# Patient Record
Sex: FEMALE | Race: WHITE | NOT HISPANIC OR LATINO | Employment: UNEMPLOYED | URBAN - METROPOLITAN AREA
[De-identification: names, ages, dates, MRNs, and addresses within clinical notes are randomized per-mention and may not be internally consistent; named-entity substitution may affect disease eponyms.]

---

## 2021-05-10 ENCOUNTER — EVALUATION (OUTPATIENT)
Dept: PHYSICAL THERAPY | Facility: CLINIC | Age: 51
End: 2021-05-10
Payer: COMMERCIAL

## 2021-05-10 DIAGNOSIS — Z98.890 STATUS POST BREAST RECONSTRUCTION: Primary | ICD-10-CM

## 2021-05-10 DIAGNOSIS — M54.6 THORACIC BACK PAIN, UNSPECIFIED BACK PAIN LATERALITY, UNSPECIFIED CHRONICITY: ICD-10-CM

## 2021-05-10 PROCEDURE — 97162 PT EVAL MOD COMPLEX 30 MIN: CPT | Performed by: PHYSICAL THERAPIST

## 2021-05-10 PROCEDURE — 97110 THERAPEUTIC EXERCISES: CPT | Performed by: PHYSICAL THERAPIST

## 2021-05-10 PROCEDURE — 97140 MANUAL THERAPY 1/> REGIONS: CPT | Performed by: PHYSICAL THERAPIST

## 2021-05-10 RX ORDER — CELECOXIB 100 MG/1
100 CAPSULE ORAL AS NEEDED
COMMUNITY

## 2021-05-10 RX ORDER — SERTRALINE HYDROCHLORIDE 25 MG/1
25 TABLET, FILM COATED ORAL DAILY
COMMUNITY

## 2021-05-10 RX ORDER — TOPIRAMATE 100 MG/1
100 TABLET, FILM COATED ORAL DAILY
COMMUNITY

## 2021-05-10 NOTE — LETTER
May 11, 2021    [address], [place]  [address] [id number]    Patient: Lisbeth Rod   YOB: 1970   Date of Visit: 5/10/2021     Encounter Diagnosis     ICD-10-CM    1  Status post breast reconstruction  Z98 890    2  Thoracic back pain, unspecified back pain laterality, unspecified chronicity  M54 6        Dear Dr Bennett Ruiz:    Thank you for your recent referral of Lisbeth Rod  Please review the attached evaluation summary from [address] [place] [address] recent visit  Please verify that you agree with the plan of care by signing the attached order  If you have any questions or concerns, please do not hesitate to call  I sincerely appreciate the opportunity to share in the care of one of your patients and hope to have another opportunity to work with you in the near future  Sincerely,    Riccardo Dempsey, PT      Referring Provider:      I certify that I have read the below Plan of Care and certify the need for these services furnished under this plan of treatment while under my care  [address], [place]  [address] [address]  Via Mail          PT Evaluation     Today's date: 5/10/2021  Patient name: Lisbeht Rod  : 1970  MRN: [id number]  Referring provider: Eli Nolasco*  Dx:   Encounter Diagnosis     ICD-10-CM    1  Status post breast reconstruction  Z98 890    2  Thoracic back pain, unspecified back pain laterality, unspecified chronicity  M54 6                   Assessment  Assessment details: Lisbeth Rod is a pleasant 54 y o  presenting to physical therapy with MD referral for Status post breast reconstruction  (primary encounter diagnosis) and Thoracic back pain, unspecified back pain laterality, unspecified chronicity      Problem list: Poor posture, decreased upper extremity strength, poor periscapular strength, myofascial restrictions, and decreased thoracic joint mobility and AROM    Treatment to include: Manual therapy techniques, myofascial stretching/mobilization, RTC/periscapular strengthening, thoracic A/AA/PROM, postural re-education, instruction in a comprehensive HEP, and modalities as needed  This pt would benefit from skilled PT services to address their impairments and functional limitations to maximize functional outcome  Symptom irritability: moderateBarriers to therapy: Chronicity of symptoms  Understanding of Dx/Px/POC: good   Prognosis: good    Goals  ST  Pt will improve thoracic SB ROM to no more than mild restriction in 3 weeks  2  Pt will improve posture to no more than mild forward rounded shoulders in 3 weeks  LT  Pt will be able to turn to check blind spot while driving with minimal restriction in 6 weeks  2  Pt will be independent in a comprehensive HEP in 6 weeks  Plan  Patient would benefit from: skilled physical therapy  Frequency: 2x week  Duration in weeks: 6  Treatment plan discussed with: patient        Subjective Evaluation    History of Present Illness  Date of surgery: 2020  Mechanism of injury: surgery  Mechanism of injury: Pt reports underwent double mastectomy 2017 and had expanders placed  Pt had the expanders removed and had two sets of implants fail between January and 2019  Pt reports after these procedures she regained full ROM  Patient states 2020, she underwent latissimus flap surgery for breast reconstruction and had expanders placed  Pt reports 4 weeks after surgery, her incision dehisced on the right side due to a seroma  Pt states a few days later, the left incision looked similar and they sutured it again  Pt reports she feels as though her skin is tight and she feels like she is in a straight jacket  Pt states the tightness improves with activity; however, when she cools back down then tension increases substantially   Patient states she feels like her muscles go into spasms and her skin is tight  Patient reports numbness in her latissimus muscles bilaterally  Pt reports she feels as though she is not able to enjoy life as much due to tension and tightness in thoracic spine  Pt feels overwhelmed to lift/carry groceries and carry them up to her 2nd floor apartment due to back discomfort  Pt states at times she will have phantom itching in her back as well as general soreness  Pt reports a small scab remains on right side  Premorbid status:  - ADLs: Independent with no difficulty  - Work: Full time, Full duty- [employer] - walking, carry heavy coin bags, and some desk work  - Recreation: strength and flexibility training 2-3 x per week    Current status:  - ADLs/Functional activities:     - Reaching into shoulder height cabinets with Pain Levels: no pain   - Reaching into overhead cabinets with mild tightness/discomfort in thoracic spine   - Washing lower back/tucking in shirt with Pain Levels: no pain   - Driving with moderate difficulty rotating when driving to check blind spots   - Lifting 5# with no pain   - Carrying laundry basket with no pain    - Sleeping with 1-2 weekly sleep disturbances due to pain  - Work: Full time, Full duty  - Recreation: returned to the gym this week  Pain  Current pain ratin  At best pain ratin  At worst pain ratin  Location: Across mid thoracic spine  Quality: tight and sharp  Aggravating factors: overhead activity  Progression: no change    Treatments  Previous treatment: medication  Current treatment: physical therapy  Patient Goals  Patient goals for therapy: increased motion, increased strength, independence with ADLs/IADLs and return to sport/leisure activities  Patient goal: would like to be able to swim on vacation        Objective     Postural Observations  Seated posture: fair  Standing posture: fair    Additional Postural Observation Details  Moderate forward rounded shoulders and forward head posture      Active Range of Motion   Cervical/Thoracic Spine       Thoracic    Flexion: Active thoracic flexion: tightness sensation  Restriction level: minimal  Extension:  Restriction level: moderate  Left lateral flexion:  Restriction level: moderate  Right lateral flexion:  Restriction level: moderate  Left rotation:  Restriction level: moderate  Right rotation:  Restriction level: moderate    Additional Active Range of Motion Details  Pt reports feeling of tension with all motions  Poor myofascial mobility most prominent around bilateral incisions in all planes       Joint Play     Hypomobile: T2, T3, T4, T5, T6, T7, T8, T9, T10, T11 and T12     Pain: T2 and T3     Strength/Myotome Testing     Left Shoulder     Planes of Motion   Flexion: 4+   Abduction: 4+   External rotation at 0°: 4+   Internal rotation at 0°: 4+     Isolated Muscles   Lower trapezius: 4-   Middle trapezius: 4-     Right Shoulder     Planes of Motion   Flexion: 4   Abduction: 4+   External rotation at 0°: 4   Internal rotation at 0°: 4     Isolated Muscles   Lower trapezius: 4-   Middle trapezius: 4-     Left Elbow   Flexion: 4+  Extension: 4+    Right Elbow   Flexion: 4+  Extension: 4+             Precautions: chronicity of symptoms, per PT, 5# lifting restriction, RA      Manuals 5-10 (IE)            Myofascial mobilizations/stretching to bilateral thoracic incision region JG                                                   Neuro Re-Ed             TB:             - rows 2 x 10 RTB NV            - pulldowns 2 x 10 RTB NV            - no money 20 x 5" RTB NV                                                   Ther Ex             UBE posterior (standing) 5 mins             Standing:             - Corner stretch 4 x 30" NV            - pball lat stretch 4 x 30"  NV            - pball thoracic SB stretch 4 x 30" ea NV                         Prone: (towel roll under forehead)             - I's 10 x 5" ea NV            - T's 10  x 5" ea NV            - Y's 10 x 5" ea NV                         Sidelying thoracic rotation mobilization with vertical foam roll 10 x 2" ea NV            Ther Activity                                       Gait Training                                       Modalities                                         * On initial evaluation, educated pt on anatomy, pathology, and exercise rationale  Provided pt with basic HEP and ensured proper exercise performance  Educated pt to call with any questions or concerns  Access Code: [email or web address]  URL: [email or web address]  Date: 05/10/2021  Prepared by: Terry Jaramillo    Exercises  Corner Pec Major Stretch - 3 x daily - 4 reps - 30 seconds hold  Seated Scapular Retraction - 3 x daily - 10 reps - 10 seconds hold  Seated Cervical Retraction - 3 x daily - 10 reps - 10 seconds hold

## 2021-05-10 NOTE — PROGRESS NOTES
PT Evaluation     Today's date: 5/10/2021  Patient name: Jostin Quarles  : 1970  MRN: [id number]  Referring provider: Lisandra Curran  Dx:   Encounter Diagnosis     ICD-10-CM    1  Status post breast reconstruction  Z98 890    2  Thoracic back pain, unspecified back pain laterality, unspecified chronicity  M54 6                   Assessment  Assessment details: Jostin Quarles is a pleasant 54 y o  presenting to physical therapy with MD referral for Status post breast reconstruction  (primary encounter diagnosis) and Thoracic back pain, unspecified back pain laterality, unspecified chronicity  Problem list: Poor posture, decreased upper extremity strength, poor periscapular strength, myofascial restrictions, and decreased thoracic joint mobility and AROM    Treatment to include: Manual therapy techniques, myofascial stretching/mobilization, RTC/periscapular strengthening, thoracic A/AA/PROM, postural re-education, instruction in a comprehensive HEP, and modalities as needed  This pt would benefit from skilled PT services to address their impairments and functional limitations to maximize functional outcome  Symptom irritability: moderateBarriers to therapy: Chronicity of symptoms  Understanding of Dx/Px/POC: good   Prognosis: good    Goals  ST  Pt will improve thoracic SB ROM to no more than mild restriction in 3 weeks  2  Pt will improve posture to no more than mild forward rounded shoulders in 3 weeks  LT  Pt will be able to turn to check blind spot while driving with minimal restriction in 6 weeks  2  Pt will be independent in a comprehensive HEP in 6 weeks      Plan  Patient would benefit from: skilled physical therapy  Frequency: 2x week  Duration in weeks: 6  Treatment plan discussed with: patient        Subjective Evaluation    History of Present Illness  Date of surgery: 2020  Mechanism of injury: surgery  Mechanism of injury: Pt reports underwent double mastectomy October 2017 and had expanders placed  Pt had the expanders removed and had two sets of implants fail between January and August 2019  Pt reports after these procedures she regained full ROM  Patient states August 2nd 2020, she underwent latissimus flap surgery for breast reconstruction and had expanders placed  Pt reports 4 weeks after surgery, her incision dehisced on the right side due to a seroma  Pt states a few days later, the left incision looked similar and they sutured it again  Pt reports she feels as though her skin is tight and she feels like she is in a straight jacket  Pt states the tightness improves with activity; however, when she cools back down then tension increases substantially  Patient states she feels like her muscles go into spasms and her skin is tight  Patient reports numbness in her latissimus muscles bilaterally  Pt reports she feels as though she is not able to enjoy life as much due to tension and tightness in thoracic spine  Pt feels overwhelmed to lift/carry groceries and carry them up to her 2nd floor apartment due to back discomfort  Pt states at times she will have phantom itching in her back as well as general soreness  Pt reports a small scab remains on right side      Premorbid status:  - ADLs: Independent with no difficulty  - Work: Full time, Full duty- [employer] - walking, carry heavy coin bags, and some desk work  - Recreation: strength and flexibility training 2-3 x per week    Current status:  - ADLs/Functional activities:     - Reaching into shoulder height cabinets with Pain Levels: no pain   - Reaching into overhead cabinets with mild tightness/discomfort in thoracic spine   - Washing lower back/tucking in shirt with Pain Levels: no pain   - Driving with moderate difficulty rotating when driving to check blind spots   - Lifting 5# with no pain   - Carrying laundry basket with no pain    - Sleeping with 1-2 weekly sleep disturbances due to pain  - Work: Full time, Full duty  - Recreation: returned to the gym this week  Pain  Current pain ratin  At best pain ratin  At worst pain ratin  Location: Across mid thoracic spine  Quality: tight and sharp  Aggravating factors: overhead activity  Progression: no change    Treatments  Previous treatment: medication  Current treatment: physical therapy  Patient Goals  Patient goals for therapy: increased motion, increased strength, independence with ADLs/IADLs and return to sport/leisure activities  Patient goal: would like to be able to swim on vacation        Objective     Postural Observations  Seated posture: fair  Standing posture: fair    Additional Postural Observation Details  Moderate forward rounded shoulders and forward head posture  Active Range of Motion   Cervical/Thoracic Spine       Thoracic    Flexion: Active thoracic flexion: tightness sensation  Restriction level: minimal  Extension:  Restriction level: moderate  Left lateral flexion:  Restriction level: moderate  Right lateral flexion:  Restriction level: moderate  Left rotation:  Restriction level: moderate  Right rotation:  Restriction level: moderate    Additional Active Range of Motion Details  Pt reports feeling of tension with all motions  Poor myofascial mobility most prominent around bilateral incisions in all planes       Joint Play     Hypomobile: T2, T3, T4, T5, T6, T7, T8, T9, T10, T11 and T12     Pain: T2 and T3     Strength/Myotome Testing     Left Shoulder     Planes of Motion   Flexion: 4+   Abduction: 4+   External rotation at 0°: 4+   Internal rotation at 0°: 4+     Isolated Muscles   Lower trapezius: 4-   Middle trapezius: 4-     Right Shoulder     Planes of Motion   Flexion: 4   Abduction: 4+   External rotation at 0°: 4   Internal rotation at 0°: 4     Isolated Muscles   Lower trapezius: 4-   Middle trapezius: 4-     Left Elbow   Flexion: 4+  Extension: 4+    Right Elbow   Flexion: 4+  Extension: 4+             Precautions: chronicity of symptoms, per PT, 5# lifting restriction, RA      Manuals 5-10 (IE)            Myofascial mobilizations/stretching to bilateral thoracic incision region JG                                                   Neuro Re-Ed             TB:             - rows 2 x 10 RTB NV            - pulldowns 2 x 10 RTB NV            - no money 20 x 5" RTB NV                                                   Ther Ex             UBE posterior (standing) 5 mins             Standing:             - Corner stretch 4 x 30" NV            - pball lat stretch 4 x 30"  NV            - pball thoracic SB stretch 4 x 30" ea NV                         Prone: (towel roll under forehead)             - I's 10 x 5" ea NV            - T's 10  x 5" ea NV            - Y's 10 x 5" ea NV                         Sidelying thoracic rotation mobilization with vertical foam roll 10 x 2" ea NV            Ther Activity                                       Gait Training                                       Modalities                                         * On initial evaluation, educated pt on anatomy, pathology, and exercise rationale  Provided pt with basic HEP and ensured proper exercise performance  Educated pt to call with any questions or concerns  Access Code: [email or web address]  URL: [email or web address]  Date: 05/10/2021  Prepared by: Orlando Crump  Corner Pec Major Stretch - 3 x daily - 4 reps - 30 seconds hold  Seated Scapular Retraction - 3 x daily - 10 reps - 10 seconds hold  Seated Cervical Retraction - 3 x daily - 10 reps - 10 seconds hold

## 2021-05-11 ENCOUNTER — TRANSCRIBE ORDERS (OUTPATIENT)
Dept: PHYSICAL THERAPY | Facility: CLINIC | Age: 51
End: 2021-05-11

## 2021-05-11 DIAGNOSIS — Z98.890 STATUS POST BREAST RECONSTRUCTION: Primary | ICD-10-CM

## 2021-05-12 ENCOUNTER — APPOINTMENT (OUTPATIENT)
Dept: PHYSICAL THERAPY | Facility: CLINIC | Age: 51
End: 2021-05-12
Payer: COMMERCIAL

## 2021-05-16 ENCOUNTER — HOSPITAL ENCOUNTER (EMERGENCY)
Facility: HOSPITAL | Age: 51
Discharge: HOME | End: 2021-05-16
Attending: EMERGENCY MEDICINE
Payer: COMMERCIAL

## 2021-05-16 ENCOUNTER — APPOINTMENT (EMERGENCY)
Dept: RADIOLOGY | Facility: HOSPITAL | Age: 51
End: 2021-05-16
Payer: COMMERCIAL

## 2021-05-16 VITALS
TEMPERATURE: 98.4 F | WEIGHT: 185.41 LBS | HEART RATE: 76 BPM | RESPIRATION RATE: 20 BRPM | OXYGEN SATURATION: 95 % | SYSTOLIC BLOOD PRESSURE: 137 MMHG | DIASTOLIC BLOOD PRESSURE: 75 MMHG

## 2021-05-16 DIAGNOSIS — J44.1 COPD WITH ACUTE EXACERBATION (HCC): Primary | ICD-10-CM

## 2021-05-16 PROCEDURE — 71045 X-RAY EXAM CHEST 1 VIEW: CPT

## 2021-05-16 PROCEDURE — 99285 EMERGENCY DEPT VISIT HI MDM: CPT | Performed by: NURSE PRACTITIONER

## 2021-05-16 PROCEDURE — 94640 AIRWAY INHALATION TREATMENT: CPT

## 2021-05-16 PROCEDURE — 99285 EMERGENCY DEPT VISIT HI MDM: CPT

## 2021-05-16 RX ORDER — SODIUM CHLORIDE FOR INHALATION 0.9 %
3 VIAL, NEBULIZER (ML) INHALATION ONCE
Status: COMPLETED | OUTPATIENT
Start: 2021-05-16 | End: 2021-05-16

## 2021-05-16 RX ORDER — HYDROCODONE POLISTIREX AND CHLORPHENIRAMINE POLISTIREX 10; 8 MG/5ML; MG/5ML
5 SUSPENSION, EXTENDED RELEASE ORAL EVERY 12 HOURS PRN
Qty: 70 ML | Refills: 0 | Status: SHIPPED | OUTPATIENT
Start: 2021-05-16

## 2021-05-16 RX ORDER — HYDROCODONE POLISTIREX AND CHLORPHENIRAMINE POLISTIREX 10; 8 MG/5ML; MG/5ML
5 SUSPENSION, EXTENDED RELEASE ORAL EVERY 12 HOURS PRN
Qty: 120 ML | Refills: 0 | Status: SHIPPED | OUTPATIENT
Start: 2021-05-16 | End: 2021-05-26

## 2021-05-16 RX ORDER — IPRATROPIUM BROMIDE AND ALBUTEROL SULFATE 2.5; .5 MG/3ML; MG/3ML
3 SOLUTION RESPIRATORY (INHALATION) ONCE
Status: COMPLETED | OUTPATIENT
Start: 2021-05-16 | End: 2021-05-16

## 2021-05-16 RX ORDER — ALBUTEROL SULFATE 2.5 MG/3ML
2.5 SOLUTION RESPIRATORY (INHALATION) EVERY 6 HOURS PRN
Qty: 60 ML | Refills: 0 | Status: SHIPPED | OUTPATIENT
Start: 2021-05-16 | End: 2021-05-21

## 2021-05-16 RX ORDER — ALBUTEROL SULFATE 2.5 MG/3ML
2.5 SOLUTION RESPIRATORY (INHALATION) ONCE
Status: COMPLETED | OUTPATIENT
Start: 2021-05-16 | End: 2021-05-16

## 2021-05-16 RX ORDER — DOXYCYCLINE HYCLATE 100 MG/1
100 CAPSULE ORAL 2 TIMES DAILY
Qty: 20 CAPSULE | Refills: 0 | Status: SHIPPED | OUTPATIENT
Start: 2021-05-16 | End: 2021-05-26

## 2021-05-16 RX ADMIN — ISODIUM CHLORIDE 3 ML: 0.03 SOLUTION RESPIRATORY (INHALATION) at 15:04

## 2021-05-16 RX ADMIN — ALBUTEROL SULFATE 2.5 MG: 2.5 SOLUTION RESPIRATORY (INHALATION) at 15:04

## 2021-05-16 RX ADMIN — HYDROCODONE BITARTRATE AND HOMATROPINE METHYLBROMIDE 5 ML: 5; 1.5 SYRUP ORAL at 15:04

## 2021-05-16 RX ADMIN — IPRATROPIUM BROMIDE AND ALBUTEROL SULFATE 3 ML: 2.5; .5 SOLUTION RESPIRATORY (INHALATION) at 15:04

## 2021-05-16 NOTE — Clinical Note
Timmyzander Ruiz was seen and treated in our emergency department on 5/16/2021  Diagnosis:      Tin Lott  may return to work on return date  She may return on this date: 05/19/2021          If you have any questions or concerns, please don't hesitate to call        LEYDI Nieto    ______________________________           _______________          _______________  Hospital Representative                              Date                                Time

## 2021-05-16 NOTE — Clinical Note
Rita Vogt was seen and treated in our emergency department on 5/16/2021  Diagnosis:     Waleska Jurado  may return to work on return date  She may return on this date: 05/19/2021         If you have any questions or concerns, please don't hesitate to call        LEYDI Mejia    ______________________________           _______________          _______________  Hospital Representative                              Date                                Time

## 2021-05-16 NOTE — ED PROVIDER NOTES
History  Chief Complaint   Patient presents with    Shortness of Breath     Patient brought in by EMS for cough and SOB since monday  Patient was put on steroids wednesday, tested negative for covid  Patient reports cough and covid has been worse  57-year-old female presents here with a chief complaint of cough  She has been experiencing this for the last several days  She reports she is on a tapering steroid pack currently  Reports recurrent cough  Shortness of Breath  Associated symptoms: cough and wheezing    Associated symptoms: no abdominal pain, no chest pain, no diaphoresis, no ear pain, no fever, no headaches, no rash, no sore throat and no vomiting        Prior to Admission Medications   Prescriptions Last Dose Informant Patient Reported? Taking?   celecoxib (CeleBREX) 100 mg capsule   Yes No   Sig: Take 100 mg by mouth as needed for mild pain   sertraline (ZOLOFT) 25 mg tablet   Yes No   Sig: Take 25 mg by mouth daily   topiramate (Topamax) 100 mg tablet   Yes No   Sig: Take 100 mg by mouth daily      Facility-Administered Medications: None       Past Medical History:   Diagnosis Date    Allergic     Migraines     Panic attacks     one per year since she was a child    Rheumatoid arthritis ([facility] [address] )        Past Surgical History:   Procedure Laterality Date    BILATERAL OOPHORECTOMY  2018    BREAST SURGERY      Breast reduction at age 23   [facility] BREAST SURGERY      Tested positive for breast cancer gene at age 39    MASTECTOMY Bilateral     Double mastectomy with expanders       Family History   Problem Relation Age of Onset    Breast cancer Mother     Arthritis Father         RA     I have reviewed and agree with the history as documented      E-Cigarette/Vaping     E-Cigarette/Vaping Substances     Social History     Tobacco Use    Smoking status: Never Smoker    Smokeless tobacco: Never Used   Substance Use Topics    Alcohol use: Not Currently    Drug use: Not Currently       Review of Systems   Constitutional: Negative for diaphoresis, fatigue and fever  HENT: Negative for congestion, ear pain, nosebleeds and sore throat  Eyes: Negative for photophobia, pain, discharge and visual disturbance  Respiratory: Positive for cough, shortness of breath and wheezing  Negative for choking and chest tightness  Cardiovascular: Negative for chest pain and palpitations  Gastrointestinal: Negative for abdominal distention, abdominal pain, diarrhea and vomiting  Genitourinary: Negative for dysuria, flank pain and frequency  Musculoskeletal: Negative for back pain, gait problem and joint swelling  Skin: Negative for color change and rash  Neurological: Negative for dizziness, syncope and headaches  Psychiatric/Behavioral: Negative for behavioral problems and confusion  The patient is not nervous/anxious  All other systems reviewed and are negative  Physical Exam  Physical Exam  Vitals signs and nursing note reviewed  Constitutional:       General: She is not in acute distress  Appearance: She is well-developed  She is not ill-appearing or toxic-appearing  HENT:      Head: Normocephalic and atraumatic  Mouth/Throat:      Dentition: Normal dentition  Eyes:      General:         Right eye: No discharge  Left eye: No discharge  Neck:      Musculoskeletal: Normal range of motion and neck supple  Cardiovascular:      Rate and Rhythm: Normal rate and regular rhythm  Pulmonary:      Effort: Accessory muscle usage present  No respiratory distress  Breath sounds: Decreased breath sounds and wheezing present  Abdominal:      General: There is no distension  Tenderness: There is no guarding  Musculoskeletal: Normal range of motion  Skin:     General: Skin is warm and dry  Neurological:      Mental Status: She is alert and oriented to person, place, and time        Coordination: Coordination normal    Psychiatric:         Behavior: Behavior is cooperative  Vital Signs  ED Triage Vitals [05/16/21 1447]   Temperature Pulse Respirations Blood Pressure SpO2   98 4 °F (36 9 °C) 94 (!) 24 148/83 97 %      Temp Source Heart Rate Source Patient Position - Orthostatic VS BP Location FiO2 (%)   Oral Monitor Sitting Right arm --      Pain Score       --           Vitals:    05/16/21 1447   BP: 148/83   Pulse: 94   Patient Position - Orthostatic VS: Sitting         Visual Acuity      ED Medications  Medications   ipratropium-albuterol (DUO-NEB) 0 5-2 5 mg/3 mL inhalation solution 3 mL (3 mL Nebulization Given 5/16/21 1504)   sodium chloride 0 9 % inhalation solution 3 mL (3 mL Nebulization Given 5/16/21 1504)   HYDROcodone-homatropine (HYCODAN) oral syrup 5 mL (5 mL Oral Given 5/16/21 1504)   albuterol inhalation solution 2 5 mg (2 5 mg Nebulization Given 5/16/21 1504)       Diagnostic Studies  Results Reviewed     None                 XR chest 1 view portable    (Results Pending)              Procedures  Procedures         ED Course                                           MDM  Number of Diagnoses or Management Options  COPD with acute exacerbation [facility]): new and requires workup     Amount and/or Complexity of Data Reviewed  Tests in the radiology section of CPT®: reviewed and ordered  Independent visualization of images, tracings, or specimens: yes    Patient Progress  Patient progress: stable      Disposition  Final diagnoses:   COPD with acute exacerbation ([facility] [address] )     Time reflects when diagnosis was documented in both MDM as applicable and the Disposition within this note     Time User Action Codes Description Comment    5/16/2021  3:59 PM Arlet Parker Add [J44 1] COPD with acute exacerbation [facility])       ED Disposition     ED Disposition Condition Date/Time Comment    Discharge Stable Sun May 16, 2021  3:59 PM Jovani Chirinos discharge to home/self care              Follow-up Information     Follow up With Specialties Details Why Contact Carmen Mena Juan Robbins,  Family Medicine Schedule an appointment as soon as possible for a visit  For Recheck [address]  [phone]            Patient's Medications   Discharge Prescriptions    ALBUTEROL (2 5 MG/3 ML) 0 083 % NEBULIZER SOLUTION    Take 1 vial (2 5 mg total) by nebulization every 6 (six) hours as needed for wheezing or shortness of breath for up to 5 days       Start Date: 5/16/2021 End Date: 5/21/2021       Order Dose: 2 5 mg       Quantity: 60 mL    Refills: 0    DOXYCYCLINE HYCLATE (VIBRAMYCIN) 100 MG CAPSULE    Take 1 capsule (100 mg total) by mouth 2 (two) times a day for 10 days       Start Date: 5/16/2021 End Date: 5/26/2021       Order Dose: 100 mg       Quantity: 20 capsule    Refills: 0    HYDROCODONE-CHLORPHENIRAMINE POLISTIREX (TUSSIONEX) 10-8 MG/5 ML ER SUSPENSION    Take 5 mL by mouth every 12 (twelve) hours as needed for cough for up to 10 daysMax Daily Amount: 10 mL       Start Date: 5/16/2021 End Date: 5/26/2021       Order Dose: 5 mL       Quantity: 120 mL    Refills: 0     Outpatient Discharge Orders   Nebulizer       PDMP Review     None          ED Provider  Electronically Signed by           LEYDI Mccullough  05/16/21 1937

## 2021-05-16 NOTE — Clinical Note
Lucia Blair was seen and treated in our emergency department on 5/16/2021  Diagnosis:     Froilan Dalal  may return to work on return date  She may return on this date: 05/19/2021         If you have any questions or concerns, please don't hesitate to call        Ovi Ghosh RN    ______________________________           _______________          _______________  Hospital Representative                              Date                                Time

## 2021-05-16 NOTE — ED CARE HANDOFF
5:26 PM 05/16/21 notified by staff at [facility] that prescribed medication submitted in electronic form was not available at that pharmacy, they have deleted the Rx and recommend submitting to another pharmacy as they will not have it in stock this week  I wrote a paper rx and printed it, notified pt it is as registration desk, she will rted tomorrow to pick it up  I have reasonably determined that electronically prescribing a controlled substance would be impractical for the patient to obtain the controlled substance prescribed by electronic prescription or would cause an untimely delay resulting in an adverse impact on the patient's medical condition        MD Maria L Kothari MD  05/16/21 [id number]

## 2021-05-19 ENCOUNTER — APPOINTMENT (OUTPATIENT)
Dept: PHYSICAL THERAPY | Facility: CLINIC | Age: 51
End: 2021-05-19
Payer: COMMERCIAL

## 2021-05-20 ENCOUNTER — APPOINTMENT (OUTPATIENT)
Dept: PHYSICAL THERAPY | Facility: CLINIC | Age: 51
End: 2021-05-20
Payer: COMMERCIAL

## 2021-05-25 ENCOUNTER — OFFICE VISIT (OUTPATIENT)
Dept: PHYSICAL THERAPY | Facility: CLINIC | Age: 51
End: 2021-05-25
Payer: COMMERCIAL

## 2021-05-25 DIAGNOSIS — Z98.890 STATUS POST BREAST RECONSTRUCTION: Primary | ICD-10-CM

## 2021-05-25 DIAGNOSIS — M54.6 THORACIC BACK PAIN, UNSPECIFIED BACK PAIN LATERALITY, UNSPECIFIED CHRONICITY: ICD-10-CM

## 2021-05-25 PROCEDURE — 97110 THERAPEUTIC EXERCISES: CPT

## 2021-05-25 PROCEDURE — 97530 THERAPEUTIC ACTIVITIES: CPT

## 2021-05-25 PROCEDURE — 97140 MANUAL THERAPY 1/> REGIONS: CPT

## 2021-05-25 NOTE — PROGRESS NOTES
Daily Note     Today's date: 2021  Patient name: Yolanda Acuna  : 1970  MRN: [id number]  Referring provider: George Nolasco*  Dx:   Encounter Diagnosis     ICD-10-CM    1  Status post breast reconstruction  Z98 890    2  Thoracic back pain, unspecified back pain laterality, unspecified chronicity  M54 6                   Subjective: Pt was sick the last 2 weeks causing increased pain from laying down and coughing excessively  Objective: See treatment diary below      Assessment: Pt did well with new TE and stretching added today, reports relief from myofascial stretching  Progress as pt is able to tolerate  Plan: Continue per plan of care        Precautions: chronicity of symptoms, per PT, 5# lifting restriction, RA      Manuals 5-10 (IE)            Myofascial mobilizations/stretching to bilateral thoracic incision region JG                                                   Neuro Re-Ed             TB:             - rows 2 x 10 RTB NV RTB 2x10           - pulldowns 2 x 10 RTB NV RTB 2x10           - no money 20 x 5" RTB NV 5"x20 RTB                                                  Ther Ex             UBE posterior (standing) 5 mins  5 min           Standing:             - Corner stretch 4 x 30" NV 4x30"           - pball lat stretch 4 x 30"  NV 4x30"           - pball thoracic SB stretch 4 x 30" ea NV 4x30" ea                         Prone: (towel roll under forehead)             - I's 10 x 5" ea NV            - T's 10  x 5" ea NV            - Y's 10 x 5" ea NV                         Sidelying thoracic rotation mobilization with vertical foam roll 10 x 2" ea NV            Ther Activity                                       Gait Training                                       Modalities

## 2021-05-27 ENCOUNTER — OFFICE VISIT (OUTPATIENT)
Dept: PHYSICAL THERAPY | Facility: CLINIC | Age: 51
End: 2021-05-27
Payer: COMMERCIAL

## 2021-05-27 DIAGNOSIS — Z98.890 STATUS POST BREAST RECONSTRUCTION: Primary | ICD-10-CM

## 2021-05-27 DIAGNOSIS — M54.6 THORACIC BACK PAIN, UNSPECIFIED BACK PAIN LATERALITY, UNSPECIFIED CHRONICITY: ICD-10-CM

## 2021-05-27 PROCEDURE — 97140 MANUAL THERAPY 1/> REGIONS: CPT

## 2021-05-27 PROCEDURE — 97110 THERAPEUTIC EXERCISES: CPT

## 2021-05-27 PROCEDURE — 97112 NEUROMUSCULAR REEDUCATION: CPT

## 2021-05-27 NOTE — PROGRESS NOTES
Daily Note     Today's date: 2021  Patient name: Jovani Chirinos  : 1970  MRN: [id number]  Referring provider: Raul Nolasco*  Dx:   Encounter Diagnosis     ICD-10-CM    1  Status post breast reconstruction  Z98 890    2  Thoracic back pain, unspecified back pain laterality, unspecified chronicity  M54 6                   Subjective: Patient reports feeling really good after LV, states that she has minor tightness today but overall improved  Objective: See treatment diary below      Assessment: Pt reports no increased pian during or post tx today  Plan: Continue per plan of care        Precautions: chronicity of symptoms, per PT, 5# lifting restriction, RA      Manuals 5-10 (IE)           Myofascial mobilizations/stretching to bilateral thoracic incision region JG HS HA                                                 Neuro Re-Ed             TB:             - rows 2 x 10 RTB NV RTB 2x10 GTB 2x10          - pulldowns 2 x 10 RTB NV RTB 2x10 GTB 2x10          - no money 20 x 5" RTB NV 5"x20 RTB 5"x20 RTB                                                 Ther Ex             UBE posterior (standing) 5 mins  5 min 5 min          Standing:             - Corner stretch 4 x 30" NV 4x30" 4x30"          - pball lat stretch 4 x 30"  NV 4x30" 4x30"          - pball thoracic SB stretch 4 x 30" ea NV 4x30" ea  4x30"          Rhomboid stretch   4x30"                                                 Prone: (towel roll under forehead)             - I's 10 x 5" ea NV            - T's 10  x 5" ea NV            - Y's 10 x 5" ea NV            Bookopeners   20"x4 ea          Sidelying thoracic rotation mobilization with vertical foam roll 10 x 2" ea NV            Ther Activity                                       Gait Training                                       Modalities

## 2021-06-01 ENCOUNTER — OFFICE VISIT (OUTPATIENT)
Dept: PHYSICAL THERAPY | Facility: CLINIC | Age: 51
End: 2021-06-01
Payer: COMMERCIAL

## 2021-06-01 ENCOUNTER — APPOINTMENT (OUTPATIENT)
Dept: PHYSICAL THERAPY | Facility: CLINIC | Age: 51
End: 2021-06-01
Payer: COMMERCIAL

## 2021-06-01 DIAGNOSIS — M54.6 THORACIC BACK PAIN, UNSPECIFIED BACK PAIN LATERALITY, UNSPECIFIED CHRONICITY: ICD-10-CM

## 2021-06-01 DIAGNOSIS — Z98.890 STATUS POST BREAST RECONSTRUCTION: Primary | ICD-10-CM

## 2021-06-01 PROCEDURE — 97140 MANUAL THERAPY 1/> REGIONS: CPT

## 2021-06-01 PROCEDURE — 97112 NEUROMUSCULAR REEDUCATION: CPT

## 2021-06-01 PROCEDURE — 97110 THERAPEUTIC EXERCISES: CPT

## 2021-06-01 NOTE — PROGRESS NOTES
Daily Note     Today's date: 2021  Patient name: Donny Aden  : 1970  MRN: [id number]  Referring provider: Nadia Nolasco*  Dx:   Encounter Diagnosis     ICD-10-CM    1  Status post breast reconstruction  Z98 890    2  Thoracic back pain, unspecified back pain laterality, unspecified chronicity  M54 6                   Subjective: Patient reports that she started doing beachbody classes over the weekend with some soreness  Objective: See treatment diary below      Assessment: Tolerated treatment well  Patient would benefit from continued PT  Trial KT tape for scar restrictions  Plan: Continue per plan of care        Precautions: chronicity of symptoms, per PT, 5# lifting restriction, RA      Manuals 5-10 (IE)          Myofascial mobilizations/stretching to bilateral thoracic incision region JG HS HA HA         KT tape for scar restrictions    HA                                   Neuro Re-Ed             TB:             - rows 2 x 10 RTB NV RTB 2x10 GTB 2x10          - pulldowns 2 x 10 RTB NV RTB 2x10 GTB 2x10          - no money 20 x 5" RTB NV 5"x20 RTB 5"x20 RTB                                                 Ther Ex             UBE posterior (standing) 5 mins  5 min 5 min 5 min         Standing:             - Corner stretch 4 x 30" NV 4x30" 4x30"          - pball lat stretch 4 x 30"  NV 4x30" 4x30"          - pball thoracic SB stretch 4 x 30" ea NV 4x30" ea  4x30"          Rhomboid stretch   4x30"          pball prayer stretch    10"x5 ea         Prone row                          Prone: (towel roll under forehead)             - I's 10 x 5" ea NV   2x10         - T's 10  x 5" ea NV   2x10         - Y's 10 x 5" ea NV   2x10         Bookopeners   20"x4 ea          Sidelying thoracic rotation mobilization with vertical foam roll 10 x 2" ea NV            Ther Activity                                       Gait Training                                       Modalities

## 2021-06-02 ENCOUNTER — OFFICE VISIT (OUTPATIENT)
Dept: PHYSICAL THERAPY | Facility: CLINIC | Age: 51
End: 2021-06-02
Payer: COMMERCIAL

## 2021-06-02 DIAGNOSIS — Z98.890 STATUS POST BREAST RECONSTRUCTION: Primary | ICD-10-CM

## 2021-06-02 DIAGNOSIS — M54.6 THORACIC BACK PAIN, UNSPECIFIED BACK PAIN LATERALITY, UNSPECIFIED CHRONICITY: ICD-10-CM

## 2021-06-02 PROCEDURE — 97140 MANUAL THERAPY 1/> REGIONS: CPT | Performed by: PHYSICAL THERAPIST

## 2021-06-02 NOTE — PROGRESS NOTES
Daily Note     Today's date: 2021  Patient name: Vilma Fowler  : 1970  MRN: [id number]  Referring provider: Jeanne Nolasco*  Dx:   Encounter Diagnosis     ICD-10-CM    1  Status post breast reconstruction  Z98 890    2  Thoracic back pain, unspecified back pain laterality, unspecified chronicity  M54 6        Start Time: 1720  Stop Time: 1800  Total time in clinic (min): 40 minutes    Subjective: Patient reports that "it feels like a truck hit me" following her previous treatment session yesterday  Patient states that they had found new trigger points to work into and she's been more sore  Objective: See treatment diary below      Assessment: Tolerated treatment well  Patient demonstrated fatigue post treatment and would benefit from continued PT  Patient continues to present with limited scar tissue mobility over R > L  Patient with tenderness along inferior border of rib cage  Deferred there ex this visit secondary to increased soreness/pain following previous treatment session  Plan to resume NV as able  Plan: Continue per plan of care  Progress treatment as tolerated         Precautions: chronicity of symptoms, per PT, 5# lifting restriction, RA      Manuals 5-10 (IE)         Myofascial mobilizations/stretching to bilateral thoracic incision region JG HS HA HA GR        KT tape for scar restrictions    HA Deferred                                  Neuro Re-Ed             TB:             - rows 2 x 10 RTB NV RTB 2x10 GTB 2x10          - pulldowns 2 x 10 RTB NV RTB 2x10 GTB 2x10          - no money 20 x 5" RTB NV 5"x20 RTB 5"x20 RTB                                                 Ther Ex             UBE posterior (standing) 5 mins  5 min 5 min 5 min 5 min        Standing:             - Corner stretch 4 x 30" NV 4x30" 4x30"          - pball lat stretch 4 x 30"  NV 4x30" 4x30"          - pball thoracic SB stretch 4 x 30" ea NV 4x30" ea  4x30"          Rhomboid stretch   4x30"          pball prayer stretch    10"x5 ea         Prone row                          Prone: (towel roll under forehead)             - I's 10 x 5" ea NV   2x10         - T's 10  x 5" ea NV   2x10         - Y's 10 x 5" ea NV   2x10         Bookopeners   20"x4 ea          Sidelying thoracic rotation mobilization with vertical foam roll 10 x 2" ea NV            Ther Activity                                       Gait Training                                       Modalities

## 2021-06-03 ENCOUNTER — APPOINTMENT (OUTPATIENT)
Dept: PHYSICAL THERAPY | Facility: CLINIC | Age: 51
End: 2021-06-03
Payer: COMMERCIAL

## 2021-06-07 ENCOUNTER — APPOINTMENT (OUTPATIENT)
Dept: PHYSICAL THERAPY | Facility: CLINIC | Age: 51
End: 2021-06-07
Payer: COMMERCIAL

## 2021-06-09 ENCOUNTER — OFFICE VISIT (OUTPATIENT)
Dept: PHYSICAL THERAPY | Facility: CLINIC | Age: 51
End: 2021-06-09
Payer: COMMERCIAL

## 2021-06-09 DIAGNOSIS — M54.6 THORACIC BACK PAIN, UNSPECIFIED BACK PAIN LATERALITY, UNSPECIFIED CHRONICITY: ICD-10-CM

## 2021-06-09 DIAGNOSIS — Z98.890 STATUS POST BREAST RECONSTRUCTION: Primary | ICD-10-CM

## 2021-06-09 PROCEDURE — 97140 MANUAL THERAPY 1/> REGIONS: CPT

## 2021-06-09 PROCEDURE — 97110 THERAPEUTIC EXERCISES: CPT

## 2021-06-09 NOTE — PROGRESS NOTES
Daily Note     Today's date: 2021  Patient name: Arden Callaway  : 1970  MRN: [id number]  Referring provider: Suzi Nolasco*  Dx:   Encounter Diagnosis     ICD-10-CM    1  Status post breast reconstruction  Z98 890    2  Thoracic back pain, unspecified back pain laterality, unspecified chronicity  M54 6        Start Time: 1715  Stop Time: 1802  Total time in clinic (min): 47 minutes    Subjective: pt reported she was rolling cones for about an hour and a half prior to treatment session and reported feeling some increase in muscle soreness  Pt noted she got her J and J Covid injection on Monday and is in some pain today  Pt noted that after last session she was a little sore but she mentioned it did feel better  Objective: See treatment diary below      Assessment:  Continued with treatment session, hold on majority of exericses due to UE in p! Due to covid vaccination  Tolerated treatment fair  Patient exhibited good technique with therapeutic exercises and would benefit from continued PT  S/p treatment session patient reported having less pain and stiffness  Plan: Continue per plan of care  Precautions: chronicity of symptoms, per PT, 5# lifting restriction, RA      Manuals 5-10 (IE)        Myofascial mobilizations/stretching to bilateral thoracic incision region JG HS HA HA GR SC STM  And stretching  B/l        KT tape for scar restrictions    HA Deferred        STM with massage ball thoracic region         To painful                     Neuro Re-Ed             TB:             - rows 2 x 10 RTB NV RTB 2x10 GTB 2x10   Hold        - pulldowns 2 x 10 RTB NV RTB 2x10 GTB 2x10   Hold        - no money 20 x 5" RTB NV 5"x20 RTB 5"x20 RTB   Hold                                               Ther Ex             UBE posterior (standing) 5 mins  5 min 5 min 5 min 5 min 6 min        Standing:             - Corner stretch 4 x 30" NV 4x30" 4x30"          - pball lat stretch 4 x 30"  NV 4x30" 4x30"   5x 30" ea          - pball thoracic SB stretch 4 x 30" ea NV 4x30" ea  4x30"          Rhomboid stretch   4x30"          pball prayer stretch    10"x5 ea         Prone row                          Prone: (towel roll under forehead)             - I's 10 x 5" ea NV   2x10  2x 10        - T's 10  x 5" ea NV   2x10  2x 10        - Y's 10 x 5" ea NV   2x10  2x 10        Bookopeners   20"x4 ea          Sidelying thoracic rotation mobilization with vertical foam roll 10 x 2" ea NV            Ther Activity                                       Gait Training                                       Modalities

## 2021-06-10 ENCOUNTER — APPOINTMENT (OUTPATIENT)
Dept: PHYSICAL THERAPY | Facility: CLINIC | Age: 51
End: 2021-06-10
Payer: COMMERCIAL

## 2021-06-14 ENCOUNTER — EVALUATION (OUTPATIENT)
Dept: PHYSICAL THERAPY | Facility: CLINIC | Age: 51
End: 2021-06-14
Payer: COMMERCIAL

## 2021-06-14 DIAGNOSIS — M54.6 THORACIC BACK PAIN, UNSPECIFIED BACK PAIN LATERALITY, UNSPECIFIED CHRONICITY: Primary | ICD-10-CM

## 2021-06-14 DIAGNOSIS — Z98.890 STATUS POST BREAST RECONSTRUCTION: ICD-10-CM

## 2021-06-14 PROCEDURE — 97140 MANUAL THERAPY 1/> REGIONS: CPT | Performed by: PHYSICAL THERAPIST

## 2021-06-14 PROCEDURE — 97112 NEUROMUSCULAR REEDUCATION: CPT | Performed by: PHYSICAL THERAPIST

## 2021-06-14 PROCEDURE — 97110 THERAPEUTIC EXERCISES: CPT | Performed by: PHYSICAL THERAPIST

## 2021-06-14 NOTE — LETTER
Mikayla 15, 2021    [address], [place]  [id number]    Patient: Donny Aden   YOB: 1970   Date of Visit: 2021     Encounter Diagnosis     ICD-10-CM    1  Thoracic back pain, unspecified back pain laterality, unspecified chronicity  M54 6    2  Status post breast reconstruction  Z98 890        Dear Dr Gloria Tanner:    Thank you for your recent referral of Donny Aden  Please review the attached evaluation summary from [address] [place] [address] recent visit  Please verify that you agree with the plan of care by signing the attached order  If you have any questions or concerns, please do not hesitate to call  I sincerely appreciate the opportunity to share in the care of one of your patients and hope to have another opportunity to work with you in the near future  Sincerely,    Gala Real, PT      Referring Provider:      I certify that I have read the below Plan of Care and certify the need for these services furnished under this plan of treatment while under my care  [address], [place]  [address] [place] [address]  Via Fax: [phone]          PT Re-Evaluation     Today's date: 2021  Patient name: Donny Aden  : 1970  MRN: [id number]  Referring provider: Nadia Nolasco*  Dx:   Encounter Diagnosis     ICD-10-CM    1  Thoracic back pain, unspecified back pain laterality, unspecified chronicity  M54 6    2  Status post breast reconstruction  Z98 890                   Assessment  Assessment details: Donny Aden is a pleasant 54 y o  presenting to physical therapy with MD referral for Status post breast reconstruction  (primary encounter diagnosis) and Thoracic back pain, unspecified back pain laterality, unspecified chronicity  She has participated in PT and has demonstrated verbal reports of improvement, improved strength, ROM, and improved FOTO scores   She would continue to benefit from skilled PT in order to address the below deficits in order to maximize her LOF  Problem list: Poor posture, decreased upper extremity strength, poor periscapular strength, myofascial restrictions, and decreased thoracic joint mobility and AROM    Treatment to include: Manual therapy techniques, myofascial stretching/mobilization, RTC/periscapular strengthening, thoracic A/AA/PROM, postural re-education, instruction in a comprehensive HEP, and modalities as needed  This pt would benefit from skilled PT services to address their impairments and functional limitations to maximize functional outcome  Symptom irritability: moderateBarriers to therapy: Chronicity of symptoms  Understanding of Dx/Px/POC: good   Prognosis: good    Goals  ST  Pt will improve thoracic SB ROM to no more than mild restriction in 3 weeks  - met   2  Pt will improve posture to no more than mild forward rounded shoulders in 3 weeks  - ONGOING    LT  Pt will be able to turn to check blind spot while driving with minimal restriction in 6 weeks  - PARTIALLY MET   2  Pt will be independent in a comprehensive HEP in 6 weeks  - ONGOING    Plan  Patient would benefit from: skilled physical therapy  Frequency: 2x week  Duration in weeks: 6  Treatment plan discussed with: patient        Subjective Evaluation    History of Present Illness  Date of surgery: 2020  Mechanism of injury: surgery  Mechanism of injury: Pt reports underwent double mastectomy 2017 and had expanders placed  Pt had the expanders removed and had two sets of implants fail between January and 2019  Pt reports after these procedures she regained full ROM  Patient states 2020, she underwent latissimus flap surgery for breast reconstruction and had expanders placed  Pt reports 4 weeks after surgery, her incision dehisced on the right side due to a seroma   Pt states a few days later, the left incision looked similar and they sutured it again  Pt reports she feels as though her skin is tight and she feels like she is in a straight jacket  Pt states the tightness improves with activity; however, when she cools back down then tension increases substantially  Patient states she feels like her muscles go into spasms and her skin is tight  Patient reports numbness in her latissimus muscles bilaterally  Pt reports she feels as though she is not able to enjoy life as much due to tension and tightness in thoracic spine  Pt feels overwhelmed to lift/carry groceries and carry them up to her 2nd floor apartment due to back discomfort  Pt states at times she will have phantom itching in her back as well as general soreness  Pt reports a small scab remains on right side  Re-eval : Pt reports that she has been having more pain recently which is thinks is related to the weather  Premorbid status:  - ADLs: Independent with no difficulty  - Work: Full time, Full duty- [employer] - walking, carry heavy coin bags, and some desk work  - Recreation: strength and flexibility training 2-3 x per week    Current status:  - ADLs/Functional activities:     - Reaching into shoulder height cabinets with Pain Levels: no pain   - Reaching into overhead cabinets with mild tightness/discomfort in thoracic spine   - Washing lower back/tucking in shirt with Pain Levels: no pain   - Driving with moderate difficulty rotating when driving to check blind spots   - Lifting 5# with no pain   - Carrying laundry basket with no pain    - Sleeping with 1-2 weekly sleep disturbances due to pain  - Work: Full time, Full duty  - Recreation: returned to the gym this week    Pain  Current pain ratin  At best pain ratin  At worst pain ratin  Location: Across mid thoracic spine  Quality: tight and sharp  Aggravating factors: overhead activity  Progression: no change    Treatments  Previous treatment: medication  Current treatment: physical therapy  Patient Goals  Patient goals for therapy: increased motion, increased strength, independence with ADLs/IADLs and return to sport/leisure activities  Patient goal: would like to be able to swim on vacation        Objective     Postural Observations  Seated posture: fair  Standing posture: fair    Additional Postural Observation Details  Moderate forward rounded shoulders and forward head posture  Active Range of Motion   Cervical/Thoracic Spine       Thoracic    Flexion: Active thoracic flexion: tightness sensation  Restriction level: minimal  Extension:  Restriction level: moderate  Left lateral flexion:  Restriction level: nil  Right lateral flexion:  Restriction level: min  Left rotation:  Restriction level: mil  Right rotation:  Restriction level: min    Additional Active Range of Motion Details  Pt reports feeling of tension with all motions  Poor myofascial mobility most prominent around bilateral incisions in all planes  Joint Play     Hypomobile: T2, T3, T4, T5, T6, T7, T8, T9, T10, T11 and T12         Strength/Myotome Testing     Left Shoulder     Planes of Motion   Flexion: 5  Abduction: 5  External rotation at 0°: 5  Internal rotation at 0°: 5     Isolated Muscles   Lower trapezius: 4-   Middle trapezius: 4-     Right Shoulder     Planes of Motion   Flexion: 4+   Abduction: 4+   External rotation at 0°: 4+   Internal rotation at 0°: 4 +    Isolated Muscles   Lower trapezius: 4-   Middle trapezius: 4-     Left Elbow   Flexion: 4+  Extension: 4+    Right Elbow   Flexion: 4+  Extension: 5         Precautions: chronicity of symptoms, per PT, 5# lifting restriction, RA      Manuals 5-10 (IE) 5/25 5/27 6/1 6/2 6/9 6/14      Myofascial mobilizations/stretching to bilateral thoracic incision region JG HS HA HA GR SC STM  And stretching  B/l  scar mobilization       KT tape for scar restrictions    HA Deferred        STM with massage ball thoracic region         To painful        Re-asesssment KB      Thoracic mobs       Gr 1-2 KB       Neuro Re-Ed             TB:             - rows 2 x 10 RTB NV RTB 2x10 GTB 2x10   Hold        - pulldowns 2 x 10 RTB NV RTB 2x10 GTB 2x10   Hold        - no money 20 x 5" RTB NV 5"x20 RTB 5"x20 RTB   Hold        Posture education        x8 min                                 Ther Ex             UBE posterior (standing) 5 mins  5 min 5 min 5 min 5 min 6 min  x8 min       Standing:             - Corner stretch 4 x 30" NV 4x30" 4x30"          - pball lat stretch 4 x 30"  NV 4x30" 4x30"   5x 30" ea          - pball thoracic SB stretch 4 x 30" ea NV 4x30" ea  4x30"          Rhomboid stretch   4x30"          pball prayer stretch    10"x5 ea         Prone row                          Prone: (towel roll under forehead)             - I's 10 x 5" ea NV   2x10  2x 10  2x 10       - T's 10  x 5" ea NV   2x10  2x 10  2x 10       - Y's 10 x 5" ea NV   2x10  2x 10  2x 10       Bookopeners   20"x4 ea          Sidelying thoracic rotation mobilization with vertical foam roll 10 x 2" ea NV            Ther Activity                                       Gait Training                                       Modalities

## 2021-06-14 NOTE — PROGRESS NOTES
PT Re-Evaluation     Today's date: 2021  Patient name: Alia Cassidy  : 1970  MRN: [id number]  Referring provider: Sergio Nolasco*  Dx:   Encounter Diagnosis     ICD-10-CM    1  Thoracic back pain, unspecified back pain laterality, unspecified chronicity  M54 6    2  Status post breast reconstruction  Z98 [id number]                   Assessment  Assessment details: Alia Cassidy is a pleasant 54 y o  presenting to physical therapy with MD referral for Status post breast reconstruction  (primary encounter diagnosis) and Thoracic back pain, unspecified back pain laterality, unspecified chronicity  She has participated in PT and has demonstrated verbal reports of improvement, improved strength, ROM, and improved FOTO scores  She would continue to benefit from skilled PT in order to address the below deficits in order to maximize her LOF  Problem list: Poor posture, decreased upper extremity strength, poor periscapular strength, myofascial restrictions, and decreased thoracic joint mobility and AROM    Treatment to include: Manual therapy techniques, myofascial stretching/mobilization, RTC/periscapular strengthening, thoracic A/AA/PROM, postural re-education, instruction in a comprehensive HEP, and modalities as needed  This pt would benefit from skilled PT services to address their impairments and functional limitations to maximize functional outcome  Symptom irritability: moderateBarriers to therapy: Chronicity of symptoms  Understanding of Dx/Px/POC: good   Prognosis: good    Goals  ST  Pt will improve thoracic SB ROM to no more than mild restriction in 3 weeks  - met   2  Pt will improve posture to no more than mild forward rounded shoulders in 3 weeks  - ONGOING    LT  Pt will be able to turn to check blind spot while driving with minimal restriction in 6 weeks  - PARTIALLY MET   2  Pt will be independent in a comprehensive HEP in 6 weeks   - ONGOING    Plan  Patient would benefit from: skilled physical therapy  Frequency: 2x week  Duration in weeks: 6  Treatment plan discussed with: patient        Subjective Evaluation    History of Present Illness  Date of surgery: 8/20/2020  Mechanism of injury: surgery  Mechanism of injury: Pt reports underwent double mastectomy October 2017 and had expanders placed  Pt had the expanders removed and had two sets of implants fail between January and August 2019  Pt reports after these procedures she regained full ROM  Patient states August 2nd 2020, she underwent latissimus flap surgery for breast reconstruction and had expanders placed  Pt reports 4 weeks after surgery, her incision dehisced on the right side due to a seroma  Pt states a few days later, the left incision looked similar and they sutured it again  Pt reports she feels as though her skin is tight and she feels like she is in a straight jacket  Pt states the tightness improves with activity; however, when she cools back down then tension increases substantially  Patient states she feels like her muscles go into spasms and her skin is tight  Patient reports numbness in her latissimus muscles bilaterally  Pt reports she feels as though she is not able to enjoy life as much due to tension and tightness in thoracic spine  Pt feels overwhelmed to lift/carry groceries and carry them up to her 2nd floor apartment due to back discomfort  Pt states at times she will have phantom itching in her back as well as general soreness  Pt reports a small scab remains on right side  Re-eval 6/14: Pt reports that she has been having more pain recently which is thinks is related to the weather       Premorbid status:  - ADLs: Independent with no difficulty  - Work: Full time, Full duty- [employer] - walking, carry heavy coin bags, and some desk work  - Recreation: strength and flexibility training 2-3 x per week    Current status:  - ADLs/Functional activities:     - Reaching into shoulder height cabinets with Pain Levels: no pain   - Reaching into overhead cabinets with mild tightness/discomfort in thoracic spine   - Washing lower back/tucking in shirt with Pain Levels: no pain   - Driving with moderate difficulty rotating when driving to check blind spots   - Lifting 5# with no pain   - Carrying laundry basket with no pain    - Sleeping with 1-2 weekly sleep disturbances due to pain  - Work: Full time, Full duty  - Recreation: returned to the gym this week  Pain  Current pain ratin  At best pain ratin  At worst pain ratin  Location: Across mid thoracic spine  Quality: tight and sharp  Aggravating factors: overhead activity  Progression: no change    Treatments  Previous treatment: medication  Current treatment: physical therapy  Patient Goals  Patient goals for therapy: increased motion, increased strength, independence with ADLs/IADLs and return to sport/leisure activities  Patient goal: would like to be able to swim on vacation        Objective     Postural Observations  Seated posture: fair  Standing posture: fair    Additional Postural Observation Details  Moderate forward rounded shoulders and forward head posture  Active Range of Motion   Cervical/Thoracic Spine       Thoracic    Flexion: Active thoracic flexion: tightness sensation  Restriction level: minimal  Extension:  Restriction level: moderate  Left lateral flexion:  Restriction level: nil  Right lateral flexion:  Restriction level: min  Left rotation:  Restriction level: mil  Right rotation:  Restriction level: min    Additional Active Range of Motion Details  Pt reports feeling of tension with all motions  Poor myofascial mobility most prominent around bilateral incisions in all planes       Joint Play     Hypomobile: T2, T3, T4, T5, T6, T7, T8, T9, T10, T11 and T12         Strength/Myotome Testing     Left Shoulder     Planes of Motion   Flexion: 5  Abduction: 5  External rotation at 0°: 5  Internal rotation at 0°: 5     Isolated Muscles   Lower trapezius: 4-   Middle trapezius: 4-     Right Shoulder     Planes of Motion   Flexion: 4+   Abduction: 4+   External rotation at 0°: 4+   Internal rotation at 0°: 4 +    Isolated Muscles   Lower trapezius: 4-   Middle trapezius: 4-     Left Elbow   Flexion: 4+  Extension: 4+    Right Elbow   Flexion: 4+  Extension: 5         Precautions: chronicity of symptoms, per PT, 5# lifting restriction, RA      Manuals 5-10 (IE) 5/25 5/27 6/1 6/2 6/9 6/14      Myofascial mobilizations/stretching to bilateral thoracic incision region JG HS HA HA GR SC STM  And stretching  B/l  scar mobilization       KT tape for scar restrictions    HA Deferred        STM with massage ball thoracic region  To painful        Re-asesssment        KB      Thoracic mobs       Gr 1-2 KB       Neuro Re-Ed             TB:             - rows 2 x 10 RTB NV RTB 2x10 GTB 2x10   Hold        - pulldowns 2 x 10 RTB NV RTB 2x10 GTB 2x10   Hold        - no money 20 x 5" RTB NV 5"x20 RTB 5"x20 RTB   Hold        Posture education        x8 min                                 Ther Ex             UBE posterior (standing) 5 mins  5 min 5 min 5 min 5 min 6 min  x8 min       Standing:             - Corner stretch 4 x 30" NV 4x30" 4x30"          - pball lat stretch 4 x 30"  NV 4x30" 4x30"   5x 30" ea          - pball thoracic SB stretch 4 x 30" ea NV 4x30" ea  4x30"          Rhomboid stretch   4x30"          pball prayer stretch    10"x5 ea         Prone row                          Prone: (towel roll under forehead)             - I's 10 x 5" ea NV   2x10  2x 10  2x 10       - T's 10  x 5" ea NV   2x10  2x 10  2x 10       - Y's 10 x 5" ea NV   2x10  2x 10  2x 10       Bookopeners   20"x4 ea          Sidelying thoracic rotation mobilization with vertical foam roll 10 x 2" ea NV            Ther Activity                                       Gait Training                                       Modalities

## 2021-06-15 ENCOUNTER — TRANSCRIBE ORDERS (OUTPATIENT)
Dept: PHYSICAL THERAPY | Facility: CLINIC | Age: 51
End: 2021-06-15

## 2021-06-15 DIAGNOSIS — Z98.890 STATUS POST BREAST RECONSTRUCTION: ICD-10-CM

## 2021-06-15 DIAGNOSIS — M54.6 THORACIC BACK PAIN, UNSPECIFIED BACK PAIN LATERALITY, UNSPECIFIED CHRONICITY: Primary | ICD-10-CM

## 2021-06-16 ENCOUNTER — OFFICE VISIT (OUTPATIENT)
Dept: PHYSICAL THERAPY | Facility: CLINIC | Age: 51
End: 2021-06-16
Payer: COMMERCIAL

## 2021-06-16 DIAGNOSIS — Z98.890 STATUS POST BREAST RECONSTRUCTION: ICD-10-CM

## 2021-06-16 DIAGNOSIS — M54.6 THORACIC BACK PAIN, UNSPECIFIED BACK PAIN LATERALITY, UNSPECIFIED CHRONICITY: Primary | ICD-10-CM

## 2021-06-16 PROCEDURE — 97140 MANUAL THERAPY 1/> REGIONS: CPT | Performed by: PHYSICAL THERAPIST

## 2021-06-16 PROCEDURE — 97110 THERAPEUTIC EXERCISES: CPT | Performed by: PHYSICAL THERAPIST

## 2021-06-16 NOTE — PROGRESS NOTES
Daily Note     Today's date: 2021  Patient name: Janna Porras  : 1970  MRN: [id number]  Referring provider: Cherelle Nolasco*  Dx:   Encounter Diagnosis     ICD-10-CM    1  Thoracic back pain, unspecified back pain laterality, unspecified chronicity  M54 6    2  Status post breast reconstruction  Z98 [id number]        Start Time: 171  Stop Time: 1840  Total time in clinic (min): 83 minutes    Subjective: Patient reports that her back has started to loosen a little and she has been working out independently at the gym  Patient has been using a massage chair as well  Objective: See treatment diary below      Assessment: Tolerated treatment fair  Patient demonstrated fatigue post treatment and would benefit from continued PT  Patient continues to have scar tissue restrictions R > L with limited QL mobility  Plan: Continue per plan of care  Progress treatment as tolerated  Precautions: chronicity of symptoms, per PT, 5# lifting restriction, RA      Manuals 5-10 (IE)      Myofascial mobilizations/stretching to bilateral thoracic incision region JG HS HA HA GR SC STM  And stretching  B/l  scar mobilization  IASTM scar tissue ; framing GR ; QL release     KT tape for scar restrictions    HA Deferred        STM with massage ball thoracic region         To painful        Re-asesssment        KB      Thoracic mobs       Gr 1-2 KB       IASTM scar tissue        GR     Neuro Re-Ed             TB:             - rows 2 x 10 RTB NV RTB 2x10 GTB 2x10   Hold        - pulldowns 2 x 10 RTB NV RTB 2x10 GTB 2x10   Hold        - no money 20 x 5" RTB NV 5"x20 RTB 5"x20 RTB   Hold        Posture education        x8 min                                 Ther Ex             UBE posterior (standing) 5 mins  5 min 5 min 5 min 5 min 6 min  x8 min  4' / 4' L2     Standing:             - Corner stretch 4 x 30" NV 4x30" 4x30"          - pball lat stretch 4 x 30"  NV 4x30" 4x30"   5x 30" ea  - pball thoracic SB stretch 4 x 30" ea NV 4x30" ea  4x30"          Rhomboid stretch   4x30"          pball prayer stretch    10"x5 ea         Prone row                          Prone: (towel roll under forehead)             - I's 10 x 5" ea NV   2x10  2x 10  2x 10       - T's 10  x 5" ea NV   2x10  2x 10  2x 10       - Y's 10 x 5" ea NV   2x10  2x 10  2x 10       Bookopeners   20"x4 ea          Sidelying thoracic rotation mobilization with vertical foam roll 10 x 2" ea NV            Thread the needle with foam roll        15x5" ea  1/2 knee lat str  15x10"     Standing QL str  At doorway        5x30"     Sidelying lat releast on foam roll        3x30" ea       Ther Activity                                       Gait Training                                       Modalities

## 2021-06-28 ENCOUNTER — APPOINTMENT (OUTPATIENT)
Dept: PHYSICAL THERAPY | Facility: CLINIC | Age: 51
End: 2021-06-28
Payer: COMMERCIAL

## 2021-06-30 ENCOUNTER — APPOINTMENT (OUTPATIENT)
Dept: PHYSICAL THERAPY | Facility: CLINIC | Age: 51
End: 2021-06-30
Payer: COMMERCIAL

## 2021-07-06 ENCOUNTER — OFFICE VISIT (OUTPATIENT)
Dept: PHYSICAL THERAPY | Facility: CLINIC | Age: 51
End: 2021-07-06
Payer: COMMERCIAL

## 2021-07-06 DIAGNOSIS — Z98.890 STATUS POST BREAST RECONSTRUCTION: ICD-10-CM

## 2021-07-06 DIAGNOSIS — M54.6 THORACIC BACK PAIN, UNSPECIFIED BACK PAIN LATERALITY, UNSPECIFIED CHRONICITY: Primary | ICD-10-CM

## 2021-07-06 PROCEDURE — 97110 THERAPEUTIC EXERCISES: CPT | Performed by: PHYSICAL THERAPIST

## 2021-07-06 PROCEDURE — 97140 MANUAL THERAPY 1/> REGIONS: CPT | Performed by: PHYSICAL THERAPIST

## 2021-07-06 NOTE — PROGRESS NOTES
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  Patient has also been more sore because of the weather  Patient has been otherwise active in the pool and stretching at home  Objective: See treatment diary below      Assessment: Tolerated treatment fair  Patient demonstrated fatigue post treatment and would benefit from continued PT  Patient with increased right thoracolumbar paraspinal muscle spasm  Improved quality over QL bilaterally  Plan to reintegrate strengthening NV  Plan: Continue per plan of care  Progress treatment as tolerated  Precautions: chronicity of symptoms, per PT, 5# lifting restriction, RA      Manuals 5-10 (IE)  7/6    Myofascial mobilizations/stretching to bilateral thoracic incision region JG HS HA HA GR SC STM  And stretching  B/l  scar mobilization  IASTM scar tissue ; framing GR ; QL release GR    KT tape for scar restrictions    HA Deferred        STM with massage ball thoracic region  To painful        Re-asesssment        KB      Thoracic mobs       Gr 1-2 KB       IASTM scar tissue        GR     Neuro Re-Ed             TB:             - rows 2 x 10 RTB NV RTB 2x10 GTB 2x10   Hold        - pulldowns 2 x 10 RTB NV RTB 2x10 GTB 2x10   Hold        - no money 20 x 5" RTB NV 5"x20 RTB 5"x20 RTB   Hold        Posture education        x8 min       Quadruped alt UE         NV    Prone alt LE ext         NV    Quadruped multifidus lift         NV    Seated multifidus rot           NV Ther Ex             UBE posterior (standing) 5 mins  5 min 5 min 5 min 5 min 6 min  x8 min  4' / 4' L2 5' / 5' L2    Standing:             - Corner stretch 4 x 30" NV 4x30" 4x30"          - pball lat stretch 4 x 30"  NV 4x30" 4x30"   5x 30" ea  - pball thoracic SB stretch 4 x 30" ea NV 4x30" ea  4x30"          Rhomboid stretch   4x30"          pball prayer stretch    10"x5 ea         Prone row                          Prone: (towel roll under forehead)             - I's 10 x 5" ea NV   2x10  2x 10  2x 10       - T's 10  x 5" ea NV   2x10  2x 10  2x 10       - Y's 10 x 5" ea NV   2x10  2x 10  2x 10       Bookopeners   20"x4 ea          Sidelying thoracic rotation mobilization with vertical foam roll 10 x 2" ea NV            Thread the needle with foam roll        15x5" ea  20x5" ea  1/2 knee lat str  15x10" 10x10"    Standing QL str  At doorway        5x30"     Sidelying lat releast on foam roll        3x30" ea       Ther Activity                                       Gait Training                                       Modalities

## 2021-07-08 ENCOUNTER — OFFICE VISIT (OUTPATIENT)
Dept: PHYSICAL THERAPY | Facility: CLINIC | Age: 51
End: 2021-07-08
Payer: COMMERCIAL

## 2021-07-08 DIAGNOSIS — M54.6 THORACIC BACK PAIN, UNSPECIFIED BACK PAIN LATERALITY, UNSPECIFIED CHRONICITY: Primary | ICD-10-CM

## 2021-07-08 DIAGNOSIS — Z98.890 STATUS POST BREAST RECONSTRUCTION: ICD-10-CM

## 2021-07-08 PROCEDURE — 97140 MANUAL THERAPY 1/> REGIONS: CPT | Performed by: PHYSICAL THERAPIST

## 2021-07-08 PROCEDURE — 97112 NEUROMUSCULAR REEDUCATION: CPT | Performed by: PHYSICAL THERAPIST

## 2021-07-08 NOTE — PROGRESS NOTES
Daily Note     Today's date: 2021  Patient name: Kristi Faith  : 1970  MRN: [id number]  Referring provider: Araceli Nolasco*  Dx:   Encounter Diagnosis     ICD-10-CM    1  Thoracic back pain, unspecified back pain laterality, unspecified chronicity  M54 6    2  Status post breast reconstruction  Z98 [id number]        Start Time: 1645  Stop Time: 1740  Total time in clinic (min): 55 minutes    Subjective: Patient reports that she is sore on the right side, which is worse d/t the weather  Objective: See treatment diary below      Assessment: Tolerated treatment fair  Patient demonstrated fatigue post treatment and would benefit from continued PT  Patient reported right sided thoracolumbar paraspinal burning following neuromuscular reeducation exercises  Plan: Continue per plan of care  Progress treatment as tolerated  Precautions: chronicity of symptoms, per PT, 5# lifting restriction, RA      Manuals 5-10 (IE)  6 6/ 6 76 7/8   Myofascial mobilizations/stretching to bilateral thoracic incision region JG HS HA HA GR SC STM  And stretching  B/l  scar mobilization  IASTM scar tissue ; framing GR ; QL release GR    KT tape for scar restrictions    HA Deferred        STM with massage ball thoracic region  To painful        Re-asesssment        KB      Thoracic mobs       Gr 1-2 KB       IASTM scar tissue        GR     Cupping          GR   Neuro Re-Ed             TB:             - rows 2 x 10 RTB NV RTB 2x10 GTB 2x10   Hold        - pulldowns 2 x 10 RTB NV RTB 2x10 GTB 2x10   Hold        - no money 20 x 5" RTB NV 5"x20 RTB 5"x20 RTB   Hold        Posture education        x8 min       Quadruped alt UE         NV    Prone alt LE ext         NV    Quadruped multifidus lift         NV    Seated multifidus rot  NV RTB 20x5" ea     Standing multifidus press          RTB 10x10"   Seated diagonal multifidus lifts with MB          YMB 2x10 5"   Ther Ex UBE posterior (standing) 5 mins  5 min 5 min 5 min 5 min 6 min  x8 min  4' / 4' L2 5' / 5' L2 4' / 4'    Standing:             - Corner stretch 4 x 30" NV 4x30" 4x30"          - pball lat stretch 4 x 30"  NV 4x30" 4x30"   5x 30" ea  - pball thoracic SB stretch 4 x 30" ea NV 4x30" ea  4x30"          Rhomboid stretch   4x30"          pball prayer stretch    10"x5 ea         Prone row                          Prone: (towel roll under forehead)             - I's 10 x 5" ea NV   2x10  2x 10  2x 10       - T's 10  x 5" ea NV   2x10  2x 10  2x 10       - Y's 10 x 5" ea NV   2x10  2x 10  2x 10       Bookopeners   20"x4 ea          Sidelying thoracic rotation mobilization with vertical foam roll 10 x 2" ea NV            Thread the needle with foam roll        15x5" ea  20x5" ea  1/2 knee lat str  15x10" 10x10"    Standing QL str  At doorway        5x30"     Sidelying lat releast on foam roll        3x30" ea       Ther Activity                                       Gait Training                                       Modalities

## 2021-07-12 ENCOUNTER — EVALUATION (OUTPATIENT)
Dept: PHYSICAL THERAPY | Facility: CLINIC | Age: 51
End: 2021-07-12
Payer: COMMERCIAL

## 2021-07-12 DIAGNOSIS — M54.6 THORACIC BACK PAIN, UNSPECIFIED BACK PAIN LATERALITY, UNSPECIFIED CHRONICITY: Primary | ICD-10-CM

## 2021-07-12 DIAGNOSIS — Z98.890 STATUS POST BREAST RECONSTRUCTION: ICD-10-CM

## 2021-07-12 PROCEDURE — 97110 THERAPEUTIC EXERCISES: CPT | Performed by: PHYSICAL THERAPIST

## 2021-07-12 PROCEDURE — 97140 MANUAL THERAPY 1/> REGIONS: CPT | Performed by: PHYSICAL THERAPIST

## 2021-07-12 NOTE — PROGRESS NOTES
PT Re-Evaluation     Today's date: 2021  Patient name: May Oreilly  : 1970  MRN: [id number]  Referring provider: Roxann Nolasco*  Dx:   Encounter Diagnosis     ICD-10-CM    1  Thoracic back pain, unspecified back pain laterality, unspecified chronicity  M54 6    2  Status post breast reconstruction  Z98 [id number]        Start Time: 602  Stop Time: 215  Total time in clinic (min): 65 minutes    Assessment  Assessment details: Since beginning physical therapy, Vaibhav Tabares has attended a total number of 11 visits and has maintained good compliance with established POC  Patient has made significant improvements in all areas, including decreased pain, increased strength, increased ROM, improved flexibility, improved joint mobility, improved postural awareness and improved overall level of function  Patient is reporting improved ability to perform a/iadls, recreational activities, work-related activities and engaging in social activities  Despite these improvements, Patient continues to present with pain, decreased strength, decreased ROM, decreased joint mobility and postural  dysfunction  Patient would continue to benefit from skilled physical therapy to address her aforementioned impairments, improve their level of function and to improve their overall quality of life  Understanding of Dx/Px/POC: excellent   Prognosis: good    Goals  STG: ALL GOALS MET  1  Pt will improve thoracic SB ROM to no more than mild restriction in 3 weeks  2  Pt will improve posture to no more than mild forward rounded shoulders in 3 weeks  LTG: ALL GOALS PROGRESSING  1  Pt will be able to turn to check blind spot while driving with minimal restriction in 6 weeks  2  Pt will be independent in a comprehensive HEP in 6 weeks      Plan  Patient would benefit from: skilled physical therapy  Planned modality interventions: cryotherapy, thermotherapy: hydrocollator packs, TENS and ultrasound  Planned therapy interventions: abdominal trunk stabilization, activity modification, behavior modification, breathing training, body mechanics training, flexibility, functional ROM exercises, home exercise program, therapeutic exercise, therapeutic activities, stretching, strengthening, postural training, patient education, neuromuscular re-education, massage, manual therapy and joint mobilization  Frequency: 1-2x week  Duration in weeks: 6  Plan of Care beginning date: 2021  Plan of Care expiration date: 2021  Treatment plan discussed with: patient        Subjective Evaluation    History of Present Illness  Mechanism of injury: Patient reports that her mobility in general has improved  Patient is able to move side to side and reach over head with greater ease  Patient is able to twist with greater ease  Patient continues to have increased pain and tightness with inactivity and excessive activity  Patient's pain is greatest when sitting  Patient estimates her overall level of function to be approximately 50% of her premorbid norm  Patient has no current f/u appointments with plastic surgery  Pain  Current pain ratin  At best pain ratin  At worst pain ratin  Location: bilateral thoracolumbar spine, bilateral hips laterally  Quality: tight and sharp    Treatments  Current treatment: physical therapy  Patient Goals  Patient goals for therapy: decreased pain, increased motion, return to work, return to [employer] activities, independence with ADLs/IADLs and increased strength          Objective     Postural Observations  Seated posture: fair  Standing posture: fair        Tenderness     Lumbar Spine  Tenderness in the spinous process (L4-S1), left transverse process (L4-S1) and right transverse process (L4-S1)       Active Range of Motion   Cervical/Thoracic Spine       Thoracic    Flexion:  WFL  Extension:  Restriction level: minimal  Left lateral flexion:  WFL  Right lateral flexion:  WFL  Left rotation:  Restriction level: minimal  Right rotation:  Restriction level: minimal  Left Shoulder   Normal active range of motion    Right Shoulder   Normal active range of motion    Lumbar   Flexion:  WFL  Extension:  Restriction level: minimal  Left lateral flexion:  WFL  Right lateral flexion:  WFL  Left rotation:  WFL  Right rotation:  [facility]    Additional Active Range of Motion Details  Fair myofascial mobility most prominent around bilateral incisions in all planes  Scar tissue thickening bilaterally R > L    Joint Play   Joints within functional limits: L1, L2, L3, L4, L5 and S1     Hypomobile: T2, T3, T4, T5, T6, T7, T8, T9, T10, T11 and T12     Pain: T2 and T3     Strength/Myotome Testing     Left Shoulder     Planes of Motion   Flexion: 5   Abduction: 5   External rotation at 0°: 5   Internal rotation at 0°: 5     Isolated Muscles   Lower trapezius: 4   Middle trapezius: 4     Right Shoulder     Planes of Motion   Flexion: 5   Abduction: 5   External rotation at 0°: 5   Internal rotation at 0°: 5     Isolated Muscles   Lower trapezius: 4   Middle trapezius: 4     Left Elbow   Flexion: 5  Extension: 5    Right Elbow   Flexion: 5  Extension: 5    Lumbar   Left   Normal strength    Right   Normal strength    Ambulation     Ambulation: Level Surfaces   Ambulation without assistive device: independent    Observational Gait   Gait: within functional limits     Functional Assessment      Squat    Left within functional limits and right within functional limits  Precautions: chronicity of symptoms, per PT, 5# lifting restriction, RA      Manuals 7/12 5/25 5/27 6/1 6/2 6/9 6/14 6/16 7/6 7/8   Myofascial mobilizations/stretching to bilateral thoracic incision region  HS HA HA GR SC STM  And stretching  B/l  scar mobilization  IASTM scar tissue ; framing GR ; QL release GR    KT tape for scar restrictions    HA Deferred        STM with massage ball thoracic region         To painful        Re-asesssment        KB      Thoracic mobs Seated thoracic rotation MWM NV      Gr 1-2 KB       IASTM scar tissue        GR     Cupping GR         GR   Reassessment GR            Neuro Re-Ed             TB:             - rows  RTB 2x10 GTB 2x10   Hold        - pulldowns  RTB 2x10 GTB 2x10   Hold        - no money  5"x20 RTB 5"x20 RTB   Hold        Posture education        x8 min       Quadruped alt UE         NV    Prone alt LE ext         NV    Quadruped multifidus lift         NV    Seated multifidus rot  NV RTB 20x5" ea  Standing multifidus press          RTB 10x10"   Seated diagonal multifidus lifts with MB          YMB 2x10 5"   Ther Ex             UBE posterior (seated) 5' / 5' 5 min 5 min 5 min 5 min 6 min  x8 min  4' / 4' L2 5' / 5' L2 4' / 4'    Standing:             - Corner stretch  4x30" 4x30"          - pball lat stretch  4x30" 4x30"   5x 30" ea  - pball thoracic SB stretch  4x30" ea  4x30"          Rhomboid stretch   4x30"          pball prayer stretch    10"x5 ea         Prone row                          Prone: (towel roll under forehead)             - I's    2x10  2x 10  2x 10       - T's    2x10  2x 10  2x 10       - Y's    2x10  2x 10  2x 10       Bookopeners   20"x4 ea          Sidelying thoracic rotation mobilization with vertical foam roll             Thread the needle with foam roll        15x5" ea  20x5" ea  1/2 knee lat str  15x10" 10x10"    Standing QL str  At doorway        5x30"     Sidelying lat releast on foam roll        3x30" ea       Patient education: HEP review GR            Standing hip abd, ext with TB NV            Supine thoracic ext over 1/2 foam roll NV                                                                Ther Activity                                       Gait Training                                       Modalities

## 2021-07-12 NOTE — LETTER
2021    [address], [place]  [address] [id number]    Patient: May Oreilly   YOB: 1970   Date of Visit: 2021     Encounter Diagnosis     ICD-10-CM    1  Thoracic back pain, unspecified back pain laterality, unspecified chronicity  M54 6    2  Status post breast reconstruction  Z98 [id number]        Dear Dr Damien Brian:    Thank you for your recent referral of May Oreilly  Please review the attached evaluation summary from [address] [place] [address] recent visit  Please verify that you agree with the plan of care by signing the attached order  If you have any questions or concerns, please do not hesitate to call  I sincerely appreciate the opportunity to share in the care of one of your patients and hope to have another opportunity to work with you in the near future  Sincerely,    Anita Greenfield, PT      Referring Provider:      I certify that I have read the below Plan of Care and certify the need for these services furnished under this plan of treatment while under my care  [address], [place]  [address] [address]  Via Fax: [phone]          PT Re-Evaluation     Today's date: 2021  Patient name: May Oreilly  : 1970  MRN: [id number]  Referring provider: Roxann Nolasco*  Dx:   Encounter Diagnosis     ICD-10-CM    1  Thoracic back pain, unspecified back pain laterality, unspecified chronicity  M54 6    2  Status post breast reconstruction  Z98 [id number]        Start Time: 79  Stop Time: 9943  Total time in clinic (min): 65 minutes    Assessment  Assessment details: Since beginning physical therapy, Vaibhav Tabares has attended a total number of 11 visits and has maintained good compliance with established POC   Patient has made significant improvements in all areas, including decreased pain, increased strength, increased ROM, improved flexibility, improved joint mobility, improved postural awareness and improved overall level of function  Patient is reporting improved ability to perform a/iadls, recreational activities, work-related activities and engaging in social activities  Despite these improvements, Patient continues to present with pain, decreased strength, decreased ROM, decreased joint mobility and postural  dysfunction  Patient would continue to benefit from skilled physical therapy to address her aforementioned impairments, improve their level of function and to improve their overall quality of life  Understanding of Dx/Px/POC: excellent   Prognosis: good    Goals  STG: ALL GOALS MET  1  Pt will improve thoracic SB ROM to no more than mild restriction in 3 weeks  2  Pt will improve posture to no more than mild forward rounded shoulders in 3 weeks  LTG: ALL GOALS PROGRESSING  1  Pt will be able to turn to check blind spot while driving with minimal restriction in 6 weeks  2  Pt will be independent in a comprehensive HEP in 6 weeks  Plan  Patient would benefit from: skilled physical therapy  Planned modality interventions: cryotherapy, thermotherapy: hydrocollator packs, TENS and ultrasound  Planned therapy interventions: abdominal trunk stabilization, activity modification, behavior modification, breathing training, body mechanics training, flexibility, functional ROM exercises, home exercise program, therapeutic exercise, therapeutic activities, stretching, strengthening, postural training, patient education, neuromuscular re-education, massage, manual therapy and joint mobilization  Frequency: 1-2x week  Duration in weeks: 6  Plan of Care beginning date: 7/12/2021  Plan of Care expiration date: 8/23/2021  Treatment plan discussed with: patient        Subjective Evaluation    History of Present Illness  Mechanism of injury: Patient reports that her mobility in general has improved  Patient is able to move side to side and reach over head with greater ease   Patient is able to twist with greater ease  Patient continues to have increased pain and tightness with inactivity and excessive activity  Patient's pain is greatest when sitting  Patient estimates her overall level of function to be approximately 50% of her premorbid norm  Patient has no current f/u appointments with plastic surgery  Pain  Current pain ratin  At best pain ratin  At worst pain ratin  Location: bilateral thoracolumbar spine, bilateral hips laterally  Quality: tight and sharp    Treatments  Current treatment: physical therapy  Patient Goals  Patient goals for therapy: decreased pain, increased motion, return to work, return to [employer] activities, independence with ADLs/IADLs and increased strength          Objective     Postural Observations  Seated posture: fair  Standing posture: fair        Tenderness     Lumbar Spine  Tenderness in the spinous process (L4-S1), left transverse process (L4-S1) and right transverse process (L4-S1)  Active Range of Motion   Cervical/Thoracic Spine       Thoracic    Flexion:  WFL  Extension:  Restriction level: minimal  Left lateral flexion:  WFL  Right lateral flexion:  WFL  Left rotation:  Restriction level: minimal  Right rotation:  Restriction level: minimal  Left Shoulder   Normal active range of motion    Right Shoulder   Normal active range of motion    Lumbar   Flexion:  WFL  Extension:  Restriction level: minimal  Left lateral flexion:  WFL  Right lateral flexion:  WFL  Left rotation:  WFL  Right rotation:  [facility]    Additional Active Range of Motion Details  Fair myofascial mobility most prominent around bilateral incisions in all planes   Scar tissue thickening bilaterally R > L    Joint Play   Joints within functional limits: L1, L2, L3, L4, L5 and S1     Hypomobile: T2, T3, T4, T5, T6, T7, T8, T9, T10, T11 and T12     Pain: T2 and T3     Strength/Myotome Testing     Left Shoulder     Planes of Motion   Flexion: 5   Abduction: 5   External rotation at 0°: 5 Internal rotation at 0°: 5     Isolated Muscles   Lower trapezius: 4   Middle trapezius: 4     Right Shoulder     Planes of Motion   Flexion: 5   Abduction: 5   External rotation at 0°: 5   Internal rotation at 0°: 5     Isolated Muscles   Lower trapezius: 4   Middle trapezius: 4     Left Elbow   Flexion: 5  Extension: 5    Right Elbow   Flexion: 5  Extension: 5    Lumbar   Left   Normal strength    Right   Normal strength    Ambulation     Ambulation: Level Surfaces   Ambulation without assistive device: independent    Observational Gait   Gait: within functional limits     Functional Assessment      Squat    Left within functional limits and right within functional limits  Precautions: chronicity of symptoms, per PT, 5# lifting restriction, RA      Manuals 7/12 5/25 5/27 6/1 6/2 6/9 6/14 6/16 7/6 7/8   Myofascial mobilizations/stretching to bilateral thoracic incision region  HS HA HA GR SC STM  And stretching  B/l  scar mobilization  IASTM scar tissue ; framing GR ; QL release GR    KT tape for scar restrictions    HA Deferred        STM with massage ball thoracic region  To painful        Re-asesssment        KB      Thoracic mobs Seated thoracic rotation MWM NV      Gr 1-2 KB       IASTM scar tissue        GR     Cupping GR         GR   Reassessment GR            Neuro Re-Ed             TB:             - rows  RTB 2x10 GTB 2x10   Hold        - pulldowns  RTB 2x10 GTB 2x10   Hold        - no money  5"x20 RTB 5"x20 RTB   Hold        Posture education        x8 min       Quadruped alt UE         NV    Prone alt LE ext         NV    Quadruped multifidus lift         NV    Seated multifidus rot  NV RTB 20x5" ea     Standing multifidus press          RTB 10x10"   Seated diagonal multifidus lifts with MB          YMB 2x10 5"   Ther Ex             UBE posterior (seated) 5' / 5' 5 min 5 min 5 min 5 min 6 min  x8 min  4' / 4' L2 5' / 5' L2 4' / 4'    Standing:             - Corner stretch 4x30" 4x30"          - pball lat stretch  4x30" 4x30"   5x 30" ea  - pball thoracic SB stretch  4x30" ea  4x30"          Rhomboid stretch   4x30"          pball prayer stretch    10"x5 ea         Prone row                          Prone: (towel roll under forehead)             - I's    2x10  2x 10  2x 10       - T's    2x10  2x 10  2x 10       - Y's    2x10  2x 10  2x 10       Bookopeners   20"x4 ea          Sidelying thoracic rotation mobilization with vertical foam roll             Thread the needle with foam roll        15x5" ea  20x5" ea  1/2 knee lat str  15x10" 10x10"    Standing QL str  At doorway        5x30"     Sidelying lat releast on foam roll        3x30" ea       Patient education: HEP review GR            Standing hip abd, ext with TB NV            Supine thoracic ext over 1/2 foam roll NV                                                                Ther Activity                                       Gait Training                                       Modalities

## 2021-07-14 ENCOUNTER — OFFICE VISIT (OUTPATIENT)
Dept: PHYSICAL THERAPY | Facility: CLINIC | Age: 51
End: 2021-07-14
Payer: COMMERCIAL

## 2021-07-14 DIAGNOSIS — M54.6 THORACIC BACK PAIN, UNSPECIFIED BACK PAIN LATERALITY, UNSPECIFIED CHRONICITY: Primary | ICD-10-CM

## 2021-07-14 DIAGNOSIS — Z98.890 STATUS POST BREAST RECONSTRUCTION: ICD-10-CM

## 2021-07-14 PROCEDURE — 97014 ELECTRIC STIMULATION THERAPY: CPT | Performed by: PHYSICAL THERAPIST

## 2021-07-14 NOTE — PROGRESS NOTES
Daily Note     Today's date: 2021  Patient name: Bandar Etienne  : 1970  MRN: [id number]  Referring provider: Amy Nolasco*  Dx:   Encounter Diagnosis     ICD-10-CM    1  Thoracic back pain, unspecified back pain laterality, unspecified chronicity  M54 6    2  Status post breast reconstruction  Z98 [id number]        Start Time: 0795  Stop Time: 1800  Total time in clinic (min): 45 minutes    Subjective: Patient reports that her back is out today and she had to take half a day at work d/t pain  Objective: See treatment diary below      Assessment: Tolerated treatment fair  Patient demonstrated fatigue post treatment and would benefit from continued PT  Patient presents with increased pain and spasm to bilateral low back  Applied MH and TENS to b/l lower back and Patient reported decreased pain and improved mobility at end of session  Anticipate resuming full treatment program at [facility] as able  Plan: Continue per plan of care  Progress treatment as tolerated  Precautions: chronicity of symptoms, per PT, 5# lifting restriction, RA      Manuals  6 7 7/8   Myofascial mobilizations/stretching to bilateral thoracic incision region   HA HA GR SC STM  And stretching  B/l  scar mobilization  IASTM scar tissue ; framing GR ; QL release GR    KT tape for scar restrictions    HA Deferred        STM with massage ball thoracic region  To painful        Re-asesssment        KB      Thoracic mobs Seated thoracic rotation MWM NV      Gr 1-2 KB       IASTM scar tissue        GR     Cupping GR         GR   Reassessment GR            Neuro Re-Ed             TB:             - rows   GTB 2x10   Hold        - pulldowns   GTB 2x10   Hold        - no money   5"x20 RTB   Hold        Posture education        x8 min       Quadruped alt UE         NV    Prone alt LE ext         NV    Quadruped multifidus lift         NV    Seated multifidus rot  NV RTB 20x5" ea  Standing multifidus press          RTB 10x10"   Seated diagonal multifidus lifts with MB          YMB 2x10 5"   Ther Ex             UBE posterior (seated) 5' / 5'  5 min 5 min 5 min 6 min  x8 min  4' / 4' L2 5' / 5' L2 4' / 4'    Standing:             - Corner stretch   4x30"          - pball lat stretch   4x30"   5x 30" ea  - pball thoracic SB stretch   4x30"          Rhomboid stretch   4x30"          pball prayer stretch    10"x5 ea         Prone row                          Prone: (towel roll under forehead)             - I's    2x10  2x 10  2x 10       - T's    2x10  2x 10  2x 10       - Y's    2x10  2x 10  2x 10       Bookopeners   20"x4 ea          Sidelying thoracic rotation mobilization with vertical foam roll             Thread the needle with foam roll        15x5" ea  20x5" ea  1/2 knee lat str  15x10" 10x10"    Standing QL str  At doorway        5x30"     Sidelying lat releast on foam roll        3x30" ea       Patient education: HEP review GR            Standing hip abd, ext with TB NV            Supine thoracic ext over 1/2 foam roll NV                                                                Ther Activity                                       Gait Training                                       Modalities             IFC +   35'

## 2021-07-19 ENCOUNTER — APPOINTMENT (OUTPATIENT)
Dept: PHYSICAL THERAPY | Facility: CLINIC | Age: 51
End: 2021-07-19
Payer: COMMERCIAL

## 2021-07-26 ENCOUNTER — OFFICE VISIT (OUTPATIENT)
Dept: PHYSICAL THERAPY | Facility: CLINIC | Age: 51
End: 2021-07-26
Payer: COMMERCIAL

## 2021-07-26 DIAGNOSIS — M54.6 THORACIC BACK PAIN, UNSPECIFIED BACK PAIN LATERALITY, UNSPECIFIED CHRONICITY: Primary | ICD-10-CM

## 2021-07-26 DIAGNOSIS — Z98.890 STATUS POST BREAST RECONSTRUCTION: ICD-10-CM

## 2021-07-26 PROCEDURE — 97014 ELECTRIC STIMULATION THERAPY: CPT | Performed by: PHYSICAL THERAPIST

## 2021-07-26 PROCEDURE — 97112 NEUROMUSCULAR REEDUCATION: CPT | Performed by: PHYSICAL THERAPIST

## 2021-07-26 PROCEDURE — 97110 THERAPEUTIC EXERCISES: CPT | Performed by: PHYSICAL THERAPIST

## 2021-07-26 NOTE — PROGRESS NOTES
Daily Note     Today's date: 2021  Patient name: Simona Luna  : 1970  MRN: [id number]  Referring provider: Zuleika Nolasco*  Dx:   Encounter Diagnosis     ICD-10-CM    1  Thoracic back pain, unspecified back pain laterality, unspecified chronicity  M54 6    2  Status post breast reconstruction  Z98 [id number]        Start Time: 8302  Stop Time: 9196  Total time in clinic (min): 45 minutes    Subjective: Patient reports that she felt great after performing TENS and MH at last visit  Patient reports that she felt more mobile with less spasm  Objective: See treatment diary below      Assessment: Tolerated treatment well  Patient demonstrated fatigue post treatment and would benefit from continued PT  Unable to complete full treatment program secondary to time constraints  Plan: Continue per plan of care  Progress treatment as tolerated  Precautions: chronicity of symptoms, per PT, 5# lifting restriction, RA      Manuals  6 7 7   Myofascial mobilizations/stretching to bilateral thoracic incision region    HA GR SC STM  And stretching  B/l  scar mobilization  IASTM scar tissue ; framing GR ; QL release GR    KT tape for scar restrictions    HA Deferred        STM with massage ball thoracic region  To painful        Re-asesssment        KB      Thoracic mobs Seated thoracic rotation MWM NV      Gr 1-2 KB       IASTM scar tissue        GR     Cupping GR         GR   Reassessment GR            Neuro Re-Ed             TB:             - rows      Hold        - pulldowns      Hold        - no money      Hold        Posture education        x8 min       Prone alt UE   2x10 5"      NV    Prone alt LE ext   2x10 5"      NV 2x10 5" ea  Quadruped multifidus lift         NV    Seated multifidus rot  NV RTB 20x5" ea     Standing multifidus press   GTB 10x10"       RTB 10x10"   Seated diagonal multifidus lifts with MB          YMB 2x10 5"   Ther Ex             UBE posterior (seated) 5' / 5'  5 min 5 min 5 min 6 min  x8 min  4' / 4' L2 5' / 5' L2 4' / 4'    Standing:             - Corner stretch             - pball lat stretch      5x 30" ea  - pball thoracic SB stretch             Rhomboid stretch             pball prayer stretch    10"x5 ea         Prone row                          Prone: (towel roll under forehead)             - I's    2x10  2x 10  2x 10       - T's    2x10  2x 10  2x 10       - Y's    2x10  2x 10  2x 10       Bookopeners             Sidelying thoracic rotation mobilization with vertical foam roll             Thread the needle with foam roll        15x5" ea  20x5" ea  1/2 knee lat str  15x10" 10x10"    Standing QL str  At doorway        5x30"     Sidelying lat releast on foam roll        3x30" ea       Patient education: HEP review GR            Standing hip abd, ext with TB NV  RTB 2x10 ea  b/l          Supine thoracic ext over 1/2 foam roll NV            Chandni   2x10 5"                                                 Ther Activity                                       Gait Training                                       Modalities             IFC +   35'

## 2021-07-28 ENCOUNTER — APPOINTMENT (OUTPATIENT)
Dept: PHYSICAL THERAPY | Facility: CLINIC | Age: 51
End: 2021-07-28
Payer: COMMERCIAL

## 2021-07-28 ENCOUNTER — OFFICE VISIT (OUTPATIENT)
Dept: PHYSICAL THERAPY | Facility: CLINIC | Age: 51
End: 2021-07-28
Payer: COMMERCIAL

## 2021-07-28 DIAGNOSIS — Z98.890 STATUS POST BREAST RECONSTRUCTION: ICD-10-CM

## 2021-07-28 DIAGNOSIS — M54.6 THORACIC BACK PAIN, UNSPECIFIED BACK PAIN LATERALITY, UNSPECIFIED CHRONICITY: Primary | ICD-10-CM

## 2021-07-28 PROCEDURE — 97110 THERAPEUTIC EXERCISES: CPT | Performed by: PHYSICAL THERAPIST

## 2021-07-28 PROCEDURE — 97140 MANUAL THERAPY 1/> REGIONS: CPT | Performed by: PHYSICAL THERAPIST

## 2021-07-28 PROCEDURE — 97014 ELECTRIC STIMULATION THERAPY: CPT | Performed by: PHYSICAL THERAPIST

## 2021-07-28 NOTE — PROGRESS NOTES
Daily Note     Today's date: 2021  Patient name: Tania Carter  : 1970  MRN: [id number]  Referring provider: Saroj Nolasco*  Dx:   Encounter Diagnosis     ICD-10-CM    1  Thoracic back pain, unspecified back pain laterality, unspecified chronicity  M54 6    2  Status post breast reconstruction  Z98 890        Start Time: 8991  Stop Time: 9839  Total time in clinic (min): 45 minutes    Subjective: Patient reports that she was counting money in one place all morning and her back is in more pain  Patient will not be here for the next 2 weeks d/t going on vacation and then her boss going on vacation  Objective: See treatment diary below      Assessment: Tolerated treatment fair  Patient demonstrated fatigue post treatment and would benefit from continued PT  Patient entered today's treatment session with increased pain d/t work-related activities  Performed cupping and TENS with good relief, Patient reported decreased tightness and pain afterwards  Deferred there ex secondary to Patient going on vacation this weekend  Plan: Continue per plan of care  Progress treatment as tolerated  Precautions: chronicity of symptoms, per PT, 5# lifting restriction, RA      Manuals  7 7/8   Myofascial mobilizations/stretching to bilateral thoracic incision region    IASTM cupping, right only GR SC STM  And stretching  B/l  scar mobilization  IASTM scar tissue ; framing GR ; QL release GR    KT tape for scar restrictions     Deferred        STM with massage ball thoracic region         To painful        Re-asesssment        KB      Thoracic mobs Seated thoracic rotation MWM NV      Gr 1-2 KB       IASTM scar tissue        GR     Cupping GR         GR   Reassessment GR            Neuro Re-Ed             TB:             - rows      Hold        - pulldowns      Hold        - no money      Hold        Posture education        x8 min       Prone alt UE   2x10 5" NV    Prone alt LE ext   2x10 5"      NV 2x10 5" ea  Quadruped multifidus lift         NV    Seated multifidus rot  NV RTB 20x5" ea  Standing multifidus press   GTB 10x10"       RTB 10x10"   Seated diagonal multifidus lifts with MB          YMB 2x10 5"   Ther Ex             UBE posterior (seated) 5' / 5'  5 min Nustep L3, UE/LE x10' 5 min 6 min  x8 min  4' / 4' L2 5' / 5' L2 4' / 4'    Standing:             - Corner stretch             - pball lat stretch      5x 30" ea  - pball thoracic SB stretch             Rhomboid stretch             pball prayer stretch             Prone row                          Prone: (towel roll under forehead)             - I's      2x 10  2x 10       - T's      2x 10  2x 10       - Y's      2x 10  2x 10       Bookopeners             Sidelying thoracic rotation mobilization with vertical foam roll             Thread the needle with foam roll        15x5" ea  20x5" ea  1/2 knee lat str  15x10" 10x10"    Standing QL str  At doorway        5x30"     Sidelying lat releast on foam roll        3x30" ea  Patient education: HEP review GR            Standing hip abd, ext with TB NV  RTB 2x10 ea  b/l          Supine thoracic ext over 1/2 foam roll NV            Clamshells   2x10 5"                                                 Ther Activity                                       Gait Training                                       Modalities             IFC + MH  35'  15 min ea

## 2021-07-28 NOTE — PROGRESS NOTES
Daily Note     Today's date: 2021  Patient name: Bettina Enamorado  : 1970  MRN: [id number]  Referring provider: Jamee Nolasco*  Dx:   Encounter Diagnosis     ICD-10-CM    1  Thoracic back pain, unspecified back pain laterality, unspecified chronicity  M54 6    2  Status post breast reconstruction  Z98 890                   Subjective: ***      Objective: See treatment diary below      Assessment: Tolerated treatment well  Patient demonstrated fatigue post treatment and would benefit from continued PT  Plan: Continue per plan of care  Progress treatment as tolerated  Precautions: chronicity of symptoms, per PT, 5# lifting restriction, RA      Manuals  7   Myofascial mobilizations/stretching to bilateral thoracic incision region     GR SC STM  And stretching  B/l  scar mobilization  IASTM scar tissue ; framing GR ; QL release GR    KT tape for scar restrictions     Deferred        STM with massage ball thoracic region  To painful        Re-asesssment        KB      Thoracic mobs Seated thoracic rotation MWM NV      Gr 1-2 KB       IASTM scar tissue        GR     Cupping GR         GR   Reassessment GR            Neuro Re-Ed             TB:             - rows      Hold        - pulldowns      Hold        - no money      Hold        Posture education        x8 min       Prone alt UE   2x10 5"      NV    Prone alt LE ext   2x10 5"      NV 2x10 5" ea  Quadruped multifidus lift         NV    Seated multifidus rot  NV RTB 20x5" ea  Standing multifidus press   GTB 10x10"       RTB 10x10"   Seated diagonal multifidus lifts with MB          YMB 2x10 5"   Ther Ex             UBE posterior (seated) 5' / 5'  5 min  5 min 6 min  x8 min  4' / 4' L2 5' / 5' L2 4' / 4'    Standing:             - Corner stretch             - pball lat stretch      5x 30" ea          - pball thoracic SB stretch             Rhomboid stretch             pball prayer stretch             Prone row                          Prone: (towel roll under forehead)             - I's      2x 10  2x 10       - T's      2x 10  2x 10       - Y's      2x 10  2x 10       Bookopeners             Sidelying thoracic rotation mobilization with vertical foam roll             Thread the needle with foam roll        15x5" ea  20x5" ea  1/2 knee lat str  15x10" 10x10"    Standing QL str  At doorway        5x30"     Sidelying lat releast on foam roll        3x30" ea       Patient education: HEP review GR            Standing hip abd, ext with TB NV  RTB 2x10 ea  b/l          Supine thoracic ext over 1/2 foam roll NV            Clamshells   2x10 5"                                                 Ther Activity                                       Gait Training                                       Modalities             IFC +   35'

## 2021-08-16 ENCOUNTER — APPOINTMENT (OUTPATIENT)
Dept: PHYSICAL THERAPY | Facility: CLINIC | Age: 51
End: 2021-08-16
Payer: COMMERCIAL

## 2021-08-16 NOTE — PROGRESS NOTES
PT Re-Evaluation     Today's date: 2021  Patient name: Ermelinda Patel  : 1970  MRN: [id number]  Referring provider: Veronika Curran  Dx:   Encounter Diagnosis     ICD-10-CM    1  Thoracic back pain, unspecified back pain laterality, unspecified chronicity  M54 6    2  Status post breast reconstruction  [id number]                   Assessment  Assessment details: Since beginning physical therapy, Ladonna Dorantes has attended a total number of 13 visits and has maintained good compliance with established POC  Patient has made significant improvements in all areas, including decreased pain, increased strength, increased ROM, improved flexibility, improved joint mobility, improved postural awareness and improved overall level of function  Patient is reporting improved ability to perform a/iadls, recreational activities, work-related activities and engaging in social activities  Despite these improvements, Patient continues to present with pain, decreased strength, decreased ROM, decreased joint mobility and postural  dysfunction  Patient would continue to benefit from skilled physical therapy to address her aforementioned impairments, improve their level of function and to improve their overall quality of life  Understanding of Dx/Px/POC: excellent   Prognosis: good    Goals  STG: ALL GOALS MET  1  Pt will improve thoracic SB ROM to no more than mild restriction in 3 weeks  2  Pt will improve posture to no more than mild forward rounded shoulders in 3 weeks  LTG: ALL GOALS PROGRESSING  1  Pt will be able to turn to check blind spot while driving with minimal restriction in 6 weeks  2  Pt will be independent in a comprehensive HEP in 6 weeks      Plan  Patient would benefit from: skilled physical therapy  Planned modality interventions: cryotherapy, thermotherapy: hydrocollator packs, TENS and ultrasound  Planned therapy interventions: abdominal trunk stabilization, activity modification, behavior modification, breathing training, body mechanics training, flexibility, functional ROM exercises, home exercise program, therapeutic exercise, therapeutic activities, stretching, strengthening, postural training, patient education, neuromuscular re-education, massage, manual therapy and joint mobilization  Frequency: 1-2x week  Duration in weeks: 6  Plan of Care beginning date: 2021  Plan of Care expiration date: 2021  Treatment plan discussed with: patient        Subjective Evaluation    Pain  Current pain ratin  At best pain ratin  At worst pain ratin  Location: bilateral thoracolumbar spine, bilateral hips laterally  Quality: tight and sharp    Treatments  Current treatment: physical therapy  Patient Goals  Patient goals for therapy: decreased pain, increased motion, return to work, return to [employer] activities, independence with ADLs/IADLs and increased strength          Objective     Postural Observations  Seated posture: fair  Standing posture: fair        Tenderness     Lumbar Spine  Tenderness in the spinous process (L4-S1), left transverse process (L4-S1) and right transverse process (L4-S1)  Active Range of Motion   Cervical/Thoracic Spine       Thoracic    Flexion:  WFL  Extension:  Restriction level: minimal  Left lateral flexion:  WFL  Right lateral flexion:  WFL  Left rotation:  Restriction level: minimal  Right rotation:  Restriction level: minimal  Left Shoulder   Normal active range of motion    Right Shoulder   Normal active range of motion    Lumbar   Flexion:  WFL  Extension:  Restriction level: minimal  Left lateral flexion:  WFL  Right lateral flexion:  WFL  Left rotation:  WFL  Right rotation:  [facility]    Additional Active Range of Motion Details  Fair myofascial mobility most prominent around bilateral incisions in all planes   Scar tissue thickening bilaterally R > L    Joint Play   Joints within functional limits: L1, L2, L3, L4, L5 and S1     Hypomobile: T2, T3, T4, T5, T6, T7, T8, T9, T10, T11 and T12     Pain: T2 and T3     Strength/Myotome Testing     Left Shoulder     Planes of Motion   Flexion: 5   Abduction: 5   External rotation at 0°: 5   Internal rotation at 0°: 5     Isolated Muscles   Lower trapezius: 4   Middle trapezius: 4     Right Shoulder     Planes of Motion   Flexion: 5   Abduction: 5   External rotation at 0°: 5   Internal rotation at 0°: 5     Isolated Muscles   Lower trapezius: 4   Middle trapezius: 4     Left Elbow   Flexion: 5  Extension: 5    Right Elbow   Flexion: 5  Extension: 5    Lumbar   Left   Normal strength    Right   Normal strength    Ambulation     Ambulation: Level Surfaces   Ambulation without assistive device: independent    Observational Gait   Gait: within functional limits     Functional Assessment      Squat    Left within functional limits and right within functional limits  Precautions: chronicity of symptoms, per PT, 5# lifting restriction, RA      Manuals 7/12 5/25 5/27 6/1 6/2 6/9 6/14 6/16 7/6 7/8   Myofascial mobilizations/stretching to bilateral thoracic incision region  HS HA HA GR SC STM  And stretching  B/l  scar mobilization  IASTM scar tissue ; framing GR ; QL release GR    KT tape for scar restrictions    HA Deferred        STM with massage ball thoracic region  To painful        Re-asesssment        KB      Thoracic mobs Seated thoracic rotation MWM NV      Gr 1-2 KB       IASTM scar tissue        GR     Cupping GR         GR   Reassessment GR            Neuro Re-Ed             TB:             - rows  RTB 2x10 GTB 2x10   Hold        - pulldowns  RTB 2x10 GTB 2x10   Hold        - no money  5"x20 RTB 5"x20 RTB   Hold        Posture education        x8 min       Quadruped alt UE         NV    Prone alt LE ext         NV    Quadruped multifidus lift         NV    Seated multifidus rot  NV RTB 20x5" ea     Standing multifidus press          RTB 10x10"   Seated diagonal multifidus lifts with MB YMB 2x10 5"   Ther Ex             UBE posterior (seated) 5' / 5' 5 min 5 min 5 min 5 min 6 min  x8 min  4' / 4' L2 5' / 5' L2 4' / 4'    Standing:             - Corner stretch  4x30" 4x30"          - pball lat stretch  4x30" 4x30"   5x 30" ea  - pball thoracic SB stretch  4x30" ea  4x30"          Rhomboid stretch   4x30"          pball prayer stretch    10"x5 ea         Prone row                          Prone: (towel roll under forehead)             - I's    2x10  2x 10  2x 10       - T's    2x10  2x 10  2x 10       - Y's    2x10  2x 10  2x 10       Bookopeners   20"x4 ea          Sidelying thoracic rotation mobilization with vertical foam roll             Thread the needle with foam roll        15x5" ea  20x5" ea  1/2 knee lat str  15x10" 10x10"    Standing QL str  At doorway        5x30"     Sidelying lat releast on foam roll        3x30" ea       Patient education: HEP review GR            Standing hip abd, ext with TB NV            Supine thoracic ext over 1/2 foam roll NV                                                                Ther Activity                                       Gait Training                                       Modalities

## 2021-08-19 ENCOUNTER — EVALUATION (OUTPATIENT)
Dept: PHYSICAL THERAPY | Facility: CLINIC | Age: 51
End: 2021-08-19
Payer: COMMERCIAL

## 2021-08-19 DIAGNOSIS — M54.6 THORACIC BACK PAIN, UNSPECIFIED BACK PAIN LATERALITY, UNSPECIFIED CHRONICITY: Primary | ICD-10-CM

## 2021-08-19 DIAGNOSIS — Z98.890 STATUS POST BREAST RECONSTRUCTION: ICD-10-CM

## 2021-08-19 PROCEDURE — 97110 THERAPEUTIC EXERCISES: CPT | Performed by: PHYSICAL THERAPIST

## 2021-08-19 PROCEDURE — 97140 MANUAL THERAPY 1/> REGIONS: CPT | Performed by: PHYSICAL THERAPIST

## 2021-08-19 NOTE — PROGRESS NOTES
PT Re-Evaluation  and PT Discharge    Today's date: 2021  Patient name: Mitch Klinefelter  : 1970  MRN: [id number]  Referring provider: Bing Nolasco*  Dx:   Encounter Diagnosis     ICD-10-CM    1  Thoracic back pain, unspecified back pain laterality, unspecified chronicity  M54 6    2  Status post breast reconstruction  [id number]        Start Time: 522  Stop Time: 1805  Total time in clinic (min): 50 minutes    Assessment  Assessment details: Since beginning physical therapy, Alcira Simon has attended a total number of 14 visits and has maintained excellent compliance with established POC  Patient has made significant improvements in all areas, including decreased pain, increased strength, increased ROM, improved flexibility, improved postural awareness, improved scar tissue mobility and improved overall level of function  Patient is reporting improved ability to perform a/iadls, recreational activities, work-related activities and engaging in social activities  Patient is independent with comprehensive HEP  Patient has been instructed to contact PT if she begins to notice a decline in function or has any questions or concerns  Patient will be discharged at this time  Patient is in agreement with plan  Understanding of Dx/Px/POC: excellent   Prognosis: good    Goals  STG: ALL GOALS MET  1  Pt will improve thoracic SB ROM to no more than mild restriction in 3 weeks  MET  2  Pt will improve posture to no more than mild forward rounded shoulders in 3 weeks  MET    LT  Pt will be able to turn to check blind spot while driving with minimal restriction in 6 weeks  MET  2  Pt will be independent in a comprehensive HEP in 6 weeks   MET    Plan  Planned therapy interventions: home exercise program  Duration in visits: 1  Plan of Care beginning date: 2021  Plan of Care expiration date: 2021  Treatment plan discussed with: patient        Subjective Evaluation    History of Present Illness  Mechanism of injury: Patient reports that her back pain has been more manageable  Patient has returned to the gym  Patient's job remains stressful, which contributes to the tension in her back  Patient does have increased tension with cold weather  Patient estimates her overall level of function to be approximately 65-70%  Patient continues to have discomfort over her incision d/t lack of mobility  Patient's next surgery is scheduled for 11/16  Patient is happy with her progress to date and is prepared for d/c to [facility] at this time  Pain  No pain reported  Location: bilateral thoracolumbar spine, bilateral hips laterally  Quality: tight    Treatments  Current treatment: physical therapy  Patient Goals  Patient goals for therapy: decreased pain, increased motion, return to work, return to [employer] activities, independence with ADLs/IADLs and increased strength          Objective     Postural Observations  Seated posture: fair  Standing posture: fair        Tenderness     Lumbar Spine  No tenderness in the spinous process, left transverse process and right transverse process  Active Range of Motion   Cervical/Thoracic Spine       Thoracic    Flexion:  WFL  Extension:  WFL  Left lateral flexion:  WFL  Right lateral flexion:  WFL  Left rotation:  Restriction level: minimal  Right rotation:  WFL  Left Shoulder   Normal active range of motion    Right Shoulder   Normal active range of motion    Lumbar   Flexion:  WFL  Extension:  WFL  Left lateral flexion:  WFL  Right lateral flexion:  WFL  Left rotation:  WFL  Right rotation:  [facility]    Additional Active Range of Motion Details  Fair myofascial mobility most prominent around bilateral incisions in all planes   Scar tissue thickening bilaterally R > L    Joint Play   Joints within functional limits: L1, L2, L3, L4, L5 and S1     Hypomobile: T2, T3, T4, T5, T6, T7, T8, T9, T10, T11 and T12     Pain: T2 and T3     Strength/Myotome Testing     Left Shoulder Planes of Motion   Flexion: 5   Abduction: 5   External rotation at 0°: 5   Internal rotation at 0°: 5     Isolated Muscles   Lower trapezius: 4   Middle trapezius: 4     Right Shoulder     Planes of Motion   Flexion: 5   Abduction: 5   External rotation at 0°: 5   Internal rotation at 0°: 5     Isolated Muscles   Lower trapezius: 4   Middle trapezius: 4     Left Elbow   Flexion: 5  Extension: 5    Right Elbow   Flexion: 5  Extension: 5    Lumbar   Left   Normal strength    Right   Normal strength    Ambulation     Ambulation: Level Surfaces   Ambulation without assistive device: independent    Observational Gait   Gait: within functional limits     Functional Assessment      Squat    Left within functional limits and right within functional limits  Flowsheet Rows      Most Recent Value   PT/OT G-Codes   Current Score  65   Projected Score  53             Precautions: chronicity of symptoms, per PT, 5# lifting restriction, RA      Manuals 7/12 7/14 7/26 7/28 8/19 6/9 6/14 6/16 7/6 7/8   Myofascial mobilizations/stretching to bilateral thoracic incision region    IASTM cupping, right only  SC STM  And stretching  B/l  scar mobilization  IASTM scar tissue ; framing GR ; QL release GR    KT tape for scar restrictions             STM with massage ball thoracic region  To painful        Re-asesssment      GR  KB      Thoracic mobs Seated thoracic rotation MWM NV      Gr 1-2 KB       IASTM scar tissue        GR     Cupping GR    GR     GR   Reassessment GR            Neuro Re-Ed             TB:             - rows      Hold        - pulldowns      Hold        - no money      Hold        Posture education        x8 min       Prone alt UE   2x10 5"      NV    Prone alt LE ext   2x10 5"      NV 2x10 5" ea  Quadruped multifidus lift         NV    Seated multifidus rot  NV RTB 20x5" ea     Standing multifidus press   GTB 10x10"       RTB 10x10"   Seated diagonal multifidus lifts with QUINN HARDY 2x10 5"   Ther Ex             UBE posterior (seated) 5' / 5'  5 min Nustep L3, UE/LE x10' Nustep, L8 UE/LE x10' 6 min  x8 min  4' / 4' L2 5' / 5' L2 4' / 4'    Standing:             - Corner stretch             - pball lat stretch      5x 30" ea  - pball thoracic SB stretch             Rhomboid stretch             pball prayer stretch             Prone row                          Prone: (towel roll under forehead)             - I's      2x 10  2x 10       - T's      2x 10  2x 10       - Y's      2x 10  2x 10       Bookopeners             Sidelying thoracic rotation mobilization with vertical foam roll             Thread the needle with foam roll        15x5" ea  20x5" ea  1/2 knee lat str  15x10" 10x10"    Standing QL str  At doorway        5x30"     Sidelying lat releast on foam roll        3x30" ea  Patient education: HEP review GR    GR        Standing hip abd, ext with TB NV  RTB 2x10 ea  b/l          Supine thoracic ext over 1/2 foam roll NV            Chandni   2x10 5"                                                 Ther Activity                                       Gait Training                                       Modalities             IFC + MH  35'  15 min ea

## 2021-08-23 ENCOUNTER — APPOINTMENT (OUTPATIENT)
Dept: PHYSICAL THERAPY | Facility: CLINIC | Age: 51
End: 2021-08-23
Payer: COMMERCIAL

## 2021-08-26 ENCOUNTER — APPOINTMENT (OUTPATIENT)
Dept: PHYSICAL THERAPY | Facility: CLINIC | Age: 51
End: 2021-08-26
Payer: COMMERCIAL

## 2021-08-30 ENCOUNTER — APPOINTMENT (OUTPATIENT)
Dept: PHYSICAL THERAPY | Facility: CLINIC | Age: 51
End: 2021-08-30
Payer: COMMERCIAL

## 2022-12-26 ENCOUNTER — APPOINTMENT (EMERGENCY)
Dept: RADIOLOGY | Facility: HOSPITAL | Age: 52
End: 2022-12-26

## 2022-12-26 ENCOUNTER — HOSPITAL ENCOUNTER (EMERGENCY)
Facility: HOSPITAL | Age: 52
Discharge: HOME | End: 2022-12-26
Attending: EMERGENCY MEDICINE

## 2022-12-26 VITALS
OXYGEN SATURATION: 99 % | HEIGHT: 63 IN | BODY MASS INDEX: 32.78 KG/M2 | WEIGHT: 185 LBS | HEART RATE: 59 BPM | TEMPERATURE: 97.3 F | DIASTOLIC BLOOD PRESSURE: 82 MMHG | RESPIRATION RATE: 19 BRPM | SYSTOLIC BLOOD PRESSURE: 143 MMHG

## 2022-12-26 DIAGNOSIS — M54.30 SCIATICA: ICD-10-CM

## 2022-12-26 DIAGNOSIS — M54.9 BACK PAIN: Primary | ICD-10-CM

## 2022-12-26 RX ORDER — DIAZEPAM 5 MG/ML
5 INJECTION, SOLUTION INTRAMUSCULAR; INTRAVENOUS ONCE
Status: COMPLETED | OUTPATIENT
Start: 2022-12-26 | End: 2022-12-26

## 2022-12-26 RX ORDER — KETOROLAC TROMETHAMINE 30 MG/ML
15 INJECTION, SOLUTION INTRAMUSCULAR; INTRAVENOUS ONCE
Status: COMPLETED | OUTPATIENT
Start: 2022-12-26 | End: 2022-12-26

## 2022-12-26 RX ORDER — METHOCARBAMOL 500 MG/1
500 TABLET, FILM COATED ORAL ONCE
Status: COMPLETED | OUTPATIENT
Start: 2022-12-26 | End: 2022-12-26

## 2022-12-26 RX ORDER — METHOCARBAMOL 500 MG/1
500 TABLET, FILM COATED ORAL 2 TIMES DAILY
Qty: 20 TABLET | Refills: 0 | Status: SHIPPED | OUTPATIENT
Start: 2022-12-26

## 2022-12-26 RX ADMIN — DIAZEPAM 5 MG: 10 INJECTION, SOLUTION INTRAMUSCULAR; INTRAVENOUS at 17:37

## 2022-12-26 RX ADMIN — KETOROLAC TROMETHAMINE 15 MG: 30 INJECTION, SOLUTION INTRAMUSCULAR; INTRAVENOUS at 17:36

## 2022-12-26 RX ADMIN — KETOROLAC TROMETHAMINE 15 MG: 30 INJECTION, SOLUTION INTRAMUSCULAR; INTRAVENOUS at 19:11

## 2022-12-26 RX ADMIN — METHOCARBAMOL 500 MG: 500 TABLET ORAL at 17:36

## 2022-12-28 ENCOUNTER — TELEPHONE (OUTPATIENT)
Dept: PHYSICAL THERAPY | Facility: OTHER | Age: 52
End: 2022-12-28

## 2022-12-31 NOTE — ED PROVIDER NOTES
History  Chief Complaint   Patient presents with   • Back Pain     Reports intermitent x1 week, lower back radiating to the L leg,reports numbness down L leg, denies loss of bowel or bladder, hx sciatica 800 motrin 15      65-year-old female presenting to the emergency department for evaluation of back pain  Patient reports tingling back pain for 1 week, and lower back, radiating to left leg, similar to previous episode of sciatica, took some Motrin around noon which did not help significantly  She denies any numbness weakness or tingling  She denies any fevers or chills  She denies any incontinence of bowel or bladder or saddle anesthesia  Prior to Admission Medications   Prescriptions Last Dose Informant Patient Reported? Taking?   celecoxib (CeleBREX) 100 mg capsule   Yes No   Sig: Take 100 mg by mouth as needed for mild pain   hydrocodone-chlorpheniramine polistirex (TUSSIONEX) 10-8 mg/5 mL ER suspension   No No   Sig: Take 5 mL by mouth every 12 (twelve) hours as needed for cough for up to 12 dosesMax Daily Amount: 10 mL   sertraline (ZOLOFT) 25 mg tablet   Yes No   Sig: Take 25 mg by mouth daily   topiramate (Topamax) 100 mg tablet   Yes No   Sig: Take 100 mg by mouth daily      Facility-Administered Medications: None       Past Medical History:   Diagnosis Date   • Allergic    • BRCA1 gene mutation positive    • Migraines    • Panic attacks     one per year since she was a child   • Rheumatoid arthritis ([facility])        Past Surgical History:   Procedure Laterality Date   • BILATERAL OOPHORECTOMY  2018   • BREAST SURGERY      Breast reduction at age 23   • BREAST SURGERY      Tested positive for breast cancer gene at age 39   • MASTECTOMY Bilateral     Double mastectomy with expanders       Family History   Problem Relation Age of Onset   • Breast cancer Mother    • Arthritis Father         RA     I have reviewed and agree with the history as documented      E-Cigarette/Vaping     E-Cigarette/Vaping Substances     Social History     Tobacco Use   • Smoking status: Never   • Smokeless tobacco: Never   Substance Use Topics   • Alcohol use: Not Currently   • Drug use: Not Currently       Review of Systems   Constitutional: Negative for appetite change, chills, fatigue and fever  HENT: Negative for sneezing and sore throat  Eyes: Negative for visual disturbance  Respiratory: Negative for cough, choking, chest tightness, shortness of breath and wheezing  Cardiovascular: Negative for chest pain and palpitations  Gastrointestinal: Negative for abdominal pain, constipation, diarrhea, nausea and vomiting  Genitourinary: Negative for difficulty urinating and dysuria  Musculoskeletal: Positive for back pain  Neurological: Negative for dizziness, weakness, light-headedness, numbness and headaches  All other systems reviewed and are negative  Physical Exam  Physical Exam  Vitals and nursing note reviewed  Constitutional:       General: She is not in acute distress  Appearance: She is well-developed  She is not diaphoretic  HENT:      Head: Normocephalic and atraumatic  Eyes:      Pupils: Pupils are equal, round, and reactive to light  Neck:      Vascular: No JVD  Trachea: No tracheal deviation  Cardiovascular:      Rate and Rhythm: Normal rate and regular rhythm  Heart sounds: Normal heart sounds  No murmur heard  No friction rub  No gallop  Pulmonary:      Effort: Pulmonary effort is normal  No respiratory distress  Breath sounds: Normal breath sounds  No wheezing or rales  Abdominal:      General: Bowel sounds are normal  There is no distension  Palpations: Abdomen is soft  Tenderness: There is no abdominal tenderness  There is no guarding or rebound  Musculoskeletal:      Comments: Patient has tenderness over the left sciatic distribution  She is neurovascularly intact distally  Skin:     General: Skin is warm and dry        Coloration: Skin is not pale  Neurological:      Mental Status: She is alert and oriented to person, place, and time  Cranial Nerves: No cranial nerve deficit  Motor: No abnormal muscle tone  Psychiatric:         Behavior: Behavior normal          Vital Signs  ED Triage Vitals [12/26/22 1631]   Temperature Pulse Respirations Blood Pressure SpO2   (!) 97 3 °F (36 3 °C) 70 18 140/89 99 %      Temp Source Heart Rate Source Patient Position - Orthostatic VS BP Location FiO2 (%)   Tympanic -- -- -- --      Pain Score       10 - Worst Possible Pain           Vitals:    12/26/22 1631 12/26/22 1715   BP: 140/89 143/82   Pulse: 70 59         Visual Acuity      ED Medications  Medications   ketorolac (TORADOL) injection 15 mg (15 mg Intravenous Given 12/26/22 1736)   methocarbamol (ROBAXIN) tablet 500 mg (500 mg Oral Given 12/26/22 1736)   diazepam (VALIUM) injection 5 mg (5 mg Intravenous Given 12/26/22 1737)   ketorolac (TORADOL) injection 15 mg (15 mg Intravenous Given 12/26/22 1911)       Diagnostic Studies  Results Reviewed     None                 XR spine lumbar 2 or 3 views injury   Final Result by Dylon Haines MD (12/27 0559)      No acute osseous abnormality  Minor discogenic degenerative change without significant disc space narrowing         Workstation performed: [id number]                    Procedures  Procedures         ED Course                               SBIRT 22yo+    Flowsheet Row Most Recent Value   SBIRT (23 yo +)    In order to provide better care to our patients, we are screening all of our patients for alcohol and drug use  Would it be okay to ask you these screening questions? Unable to answer at this time Filed at: 12/26/2022 [facility]                    [facility]  Number of Diagnoses or Management Options  Back pain  Sciatica  Diagnosis management comments: 62-year-old female with back pain consistent with sciatica, neurovascular exam is benign    Likely to be exacerbation of chronic back pain, will check imaging for acute osseous abnormality, will give pain meds, refer to comprehensive spine  Disposition  Final diagnoses:   Back pain   Sciatica     Time reflects when diagnosis was documented in both MDM as applicable and the Disposition within this note     Time User Action Codes Description Comment    12/26/2022  6:52 PM Des Kristiandamien Hannah Add [M54 9] Back pain     12/26/2022  6:52 PM Maday Solorio Add [M54 30] Sciatica       ED Disposition     ED Disposition   Discharge    Condition   Stable    Date/Time   Mon Dec 26, 2022  6:52 PM    Comment   Sara Calix discharge to home/self care                 Follow-up Information     Follow up With Specialties Details Why [address] [place] [address]   [address] [address] [facility] [address]  [phone]            Discharge Medication List as of 12/26/2022  6:53 PM      START taking these medications    Details   methocarbamol (ROBAXIN) 500 mg tablet Take 1 tablet (500 mg total) by mouth 2 (two) times a day, Starting Mon 12/26/2022, Normal         CONTINUE these medications which have NOT CHANGED    Details   celecoxib (CeleBREX) 100 mg capsule Take 100 mg by mouth as needed for mild pain, Historical Med      hydrocodone-chlorpheniramine polistirex (TUSSIONEX) 10-8 mg/5 mL ER suspension Take 5 mL by mouth every 12 (twelve) hours as needed for cough for up to 12 dosesMax Daily Amount: 10 mL, Starting Sun 5/16/2021, Print      sertraline (ZOLOFT) 25 mg tablet Take 25 mg by mouth daily, Historical Med      topiramate (Topamax) 100 mg tablet Take 100 mg by mouth daily, Historical Med                 PDMP Review     None          ED Provider  Electronically Signed by           Troy Hobson MD  12/30/22 2049

## 2023-03-21 ENCOUNTER — APPOINTMENT (EMERGENCY)
Dept: CT IMAGING | Facility: HOSPITAL | Age: 53
End: 2023-03-21

## 2023-03-21 ENCOUNTER — HOSPITAL ENCOUNTER (EMERGENCY)
Facility: HOSPITAL | Age: 53
Discharge: HOME | End: 2023-03-21
Attending: EMERGENCY MEDICINE

## 2023-03-21 VITALS
OXYGEN SATURATION: 96 % | RESPIRATION RATE: 17 BRPM | SYSTOLIC BLOOD PRESSURE: 151 MMHG | TEMPERATURE: 98.3 F | HEART RATE: 67 BPM | DIASTOLIC BLOOD PRESSURE: 97 MMHG

## 2023-03-21 DIAGNOSIS — R40.20 LOSS OF CONSCIOUSNESS (HCC): ICD-10-CM

## 2023-03-21 DIAGNOSIS — R42 VERTIGO: Primary | ICD-10-CM

## 2023-03-21 LAB
2HR DELTA HS TROPONIN: 3 NG/L
ALBUMIN SERPL BCP-MCNC: 4.5 G/DL (ref 3.5–5)
ALP SERPL-CCNC: 86 U/L (ref 34–104)
ALT SERPL W P-5'-P-CCNC: 12 U/L (ref 7–52)
ANION GAP SERPL CALCULATED.3IONS-SCNC: 9 MMOL/L (ref 4–13)
APTT PPP: 28 SECONDS (ref 23–37)
AST SERPL W P-5'-P-CCNC: 11 U/L (ref 13–39)
ATRIAL RATE: 100 BPM
BASOPHILS # BLD AUTO: 0.04 THOUSANDS/ÂΜL (ref 0–0.1)
BASOPHILS NFR BLD AUTO: 1 % (ref 0–1)
BILIRUB SERPL-MCNC: 0.33 MG/DL (ref 0.2–1)
BUN SERPL-MCNC: 11 MG/DL (ref 5–25)
CALCIUM SERPL-MCNC: 9.4 MG/DL (ref 8.4–10.2)
CARDIAC TROPONIN I PNL SERPL HS: 3 NG/L
CARDIAC TROPONIN I PNL SERPL HS: 6 NG/L
CHLORIDE SERPL-SCNC: 108 MMOL/L (ref 96–108)
CO2 SERPL-SCNC: 20 MMOL/L (ref 21–32)
CREAT SERPL-MCNC: 0.78 MG/DL (ref 0.6–1.3)
EOSINOPHIL # BLD AUTO: 0.22 THOUSAND/ÂΜL (ref 0–0.61)
EOSINOPHIL NFR BLD AUTO: 3 % (ref 0–6)
ERYTHROCYTE [DISTWIDTH] IN BLOOD BY AUTOMATED COUNT: 14.8 % (ref 11.6–15.1)
FLUAV RNA RESP QL NAA+PROBE: NEGATIVE
FLUBV RNA RESP QL NAA+PROBE: NEGATIVE
GFR SERPL CREATININE-BSD FRML MDRD: 88 ML/MIN/1.73SQ M
GLUCOSE SERPL-MCNC: 76 MG/DL (ref 65–140)
GLUCOSE SERPL-MCNC: 93 MG/DL (ref 65–140)
HCG SERPL QL: NEGATIVE
HCT VFR BLD AUTO: 45.5 % (ref 34.8–46.1)
HGB BLD-MCNC: 14.7 G/DL (ref 11.5–15.4)
IMM GRANULOCYTES # BLD AUTO: 0.02 THOUSAND/UL (ref 0–0.2)
IMM GRANULOCYTES NFR BLD AUTO: 0 % (ref 0–2)
INR PPP: 1.09 (ref 0.84–1.19)
LIPASE SERPL-CCNC: 6 U/L (ref 11–82)
LYMPHOCYTES # BLD AUTO: 3.27 THOUSANDS/ÂΜL (ref 0.6–4.47)
LYMPHOCYTES NFR BLD AUTO: 42 % (ref 14–44)
MCH RBC QN AUTO: 27.1 PG (ref 26.8–34.3)
MCHC RBC AUTO-ENTMCNC: 32.3 G/DL (ref 31.4–37.4)
MCV RBC AUTO: 84 FL (ref 82–98)
MONOCYTES # BLD AUTO: 0.53 THOUSAND/ÂΜL (ref 0.17–1.22)
MONOCYTES NFR BLD AUTO: 7 % (ref 4–12)
NEUTROPHILS # BLD AUTO: 3.71 THOUSANDS/ÂΜL (ref 1.85–7.62)
NEUTS SEG NFR BLD AUTO: 47 % (ref 43–75)
NRBC BLD AUTO-RTO: 0 /100 WBCS
P AXIS: 43 DEGREES
PLATELET # BLD AUTO: 312 THOUSANDS/UL (ref 149–390)
PMV BLD AUTO: 10.2 FL (ref 8.9–12.7)
POTASSIUM SERPL-SCNC: 3.7 MMOL/L (ref 3.5–5.3)
PR INTERVAL: 132 MS
PROT SERPL-MCNC: 7.9 G/DL (ref 6.4–8.4)
PROTHROMBIN TIME: 13.9 SECONDS (ref 11.6–14.5)
QRS AXIS: 77 DEGREES
QRSD INTERVAL: 96 MS
QT INTERVAL: 358 MS
QTC INTERVAL: 461 MS
RBC # BLD AUTO: 5.43 MILLION/UL (ref 3.81–5.12)
RSV RNA RESP QL NAA+PROBE: NEGATIVE
SARS-COV-2 RNA RESP QL NAA+PROBE: NEGATIVE
SODIUM SERPL-SCNC: 137 MMOL/L (ref 135–147)
T WAVE AXIS: 48 DEGREES
TSH SERPL DL<=0.05 MIU/L-ACNC: 0.6 UIU/ML (ref 0.45–4.5)
VENTRICULAR RATE: 100 BPM
WBC # BLD AUTO: 7.79 THOUSAND/UL (ref 4.31–10.16)

## 2023-03-21 RX ORDER — MECLIZINE HYDROCHLORIDE 25 MG/1
50 TABLET ORAL ONCE
Status: COMPLETED | OUTPATIENT
Start: 2023-03-21 | End: 2023-03-21

## 2023-03-21 RX ADMIN — SODIUM CHLORIDE 1000 ML: 0.9 INJECTION, SOLUTION INTRAVENOUS at 15:18

## 2023-03-21 RX ADMIN — IOHEXOL 125 ML: 350 INJECTION, SOLUTION INTRAVENOUS at 16:07

## 2023-03-21 RX ADMIN — MECLIZINE HYDROCHLORIDE 50 MG: 25 TABLET ORAL at 15:16

## 2023-03-21 NOTE — ED PROVIDER NOTES
History  Chief Complaint   Patient presents with   • Dizziness     3 "real weird" episodes last wk, "not dizzy not room spinning but like the counter top moved towards me"; urgent care treated sinusitis and vertigo; another episode pt got real hot and felt like she was going to pass out, "I lost 20 mins of time on the side of the road", pt was incontinent of bowels, admits to a seizure 1 time in the past after an anxiety attack, couldn't get out of bed yesterday, admits to a lot of stress recently and lost both parents      Patient is a 79-year-old female past medical Struve migraines, rheumatoid arthritis, anxiety presenting for multiple complaints  Patient states over the last 2 to 3 weeks she has had multiple "episodes" that were very disturbing  She states that roughly 2 to 3 weeks ago she had an episode where she was standing drink coffee at the counter when the counter started to move toward her face  She states that this lasted seconds and she did spill her coffee but she did not fall or hit her head  She states that she was seen in urgent care after that and was prescribed doxycycline and meclizine  Completed the doxycycline and states she has been using the meclizine for intermittent "feeling of being on a ship" and that it helps  She also states that 2 days ago she had an episode while she was driving where she started to feel not well and knew she was going to pass out  States that she drove the side of the road put her head down and woke up roughly 20 minutes later without memory of the event states that she was incontinent of bowels at that time but did not bite her tongue  Notes a history of 1 prior seizure following a panic attack  She states that in the past several weeks she has had intermittent epigastric pain associated with shortness of breath and nausea which is sometimes improved with Gas-X or Maalox  She also notes intermittent left fingertip numbness and tingling for the last month  Denies any recent falls or head injuries  States that she did lose both of her parents within the last year and has been under a lot of stress  She denies any leg pain or swelling, vomiting/diarrhea/constipation, unilateral weakness, dysuria, fevers, rashes, vision changes  Denies any fevers, nasal congestion, ear pain  Prior to Admission Medications   Prescriptions Last Dose Informant Patient Reported? Taking?   celecoxib (CeleBREX) 100 mg capsule   Yes No   Sig: Take 100 mg by mouth as needed for mild pain   hydrocodone-chlorpheniramine polistirex (TUSSIONEX) 10-8 mg/5 mL ER suspension   No No   Sig: Take 5 mL by mouth every 12 (twelve) hours as needed for cough for up to 12 dosesMax Daily Amount: 10 mL   methocarbamol (ROBAXIN) 500 mg tablet   No No   Sig: Take 1 tablet (500 mg total) by mouth 2 (two) times a day   sertraline (ZOLOFT) 25 mg tablet   Yes No   Sig: Take 25 mg by mouth daily   topiramate (Topamax) 100 mg tablet   Yes No   Sig: Take 100 mg by mouth daily      Facility-Administered Medications: None       Past Medical History:   Diagnosis Date   • Allergic    • BRCA1 gene mutation positive    • Migraines    • Panic attacks     one per year since she was a child   • Rheumatoid arthritis ([facility] [address] )        Past Surgical History:   Procedure Laterality Date   • BILATERAL OOPHORECTOMY  2018   • BREAST SURGERY      Breast reduction at age 23   • BREAST SURGERY      Tested positive for breast cancer gene at age 39   • MASTECTOMY Bilateral     Double mastectomy with expanders       Family History   Problem Relation Age of Onset   • Breast cancer Mother    • Arthritis Father         RA     I have reviewed and agree with the history as documented      E-Cigarette/Vaping     E-Cigarette/Vaping Substances     Social History     Tobacco Use   • Smoking status: Never   • Smokeless tobacco: Never   Substance Use Topics   • Alcohol use: Not Currently   • Drug use: Not Currently       Review of Systems   All other systems reviewed and are negative  Physical Exam  Physical Exam  Vitals reviewed  Constitutional:       General: She is not in acute distress  Appearance: Normal appearance  She is not ill-appearing  HENT:      Mouth/Throat:      Mouth: Mucous membranes are moist    Eyes:      Extraocular Movements: Extraocular movements intact  Conjunctiva/sclera: Conjunctivae normal       Pupils: Pupils are equal, round, and reactive to light  Cardiovascular:      Rate and Rhythm: Normal rate and regular rhythm  Pulses: Normal pulses  Heart sounds: Normal heart sounds  Pulmonary:      Effort: Pulmonary effort is normal       Breath sounds: Normal breath sounds  Abdominal:      General: Abdomen is flat  Palpations: Abdomen is soft  Tenderness: There is no abdominal tenderness  Musculoskeletal:         General: No swelling  Normal range of motion  Cervical back: Neck supple  Right lower leg: No edema  Left lower leg: No edema  Skin:     General: Skin is warm and dry  Neurological:      General: No focal deficit present  Mental Status: She is alert  Cranial Nerves: No cranial nerve deficit  Sensory: No sensory deficit  Motor: No weakness        Coordination: Coordination normal       Gait: Gait normal    Psychiatric:         Mood and Affect: Mood normal          Vital Signs  ED Triage Vitals [03/21/23 1336]   Temperature Pulse Respirations Blood Pressure SpO2   98 3 °F (36 8 °C) 105 20 (!) 159/101 98 %      Temp Source Heart Rate Source Patient Position - Orthostatic VS BP Location FiO2 (%)   Temporal Monitor Sitting Left arm --      Pain Score       --           Vitals:    03/21/23 1336   BP: (!) 159/101   Pulse: 105   Patient Position - Orthostatic VS: Sitting         Visual Acuity      ED Medications  Medications   sodium chloride 0 9 % bolus 1,000 mL (has no administration in time range)   meclizine (ANTIVERT) tablet 50 mg (has no administration in time range)       Diagnostic Studies  Results Reviewed     None                 No orders to display              Procedures  ECG 12 Lead Documentation Only    Date/Time: 3/21/2023 3:30 PM  Performed by: Azam Painter DO  Authorized by: Azam Painter DO     Patient location:  ED  Previous ECG:     Previous ECG:  Unavailable  Interpretation:     Interpretation: normal    Rate:     ECG rate assessment: normal    Rhythm:     Rhythm: sinus rhythm    Ectopy:     Ectopy: none    QRS:     QRS axis:  Normal    QRS intervals:  Normal  Conduction:     Conduction: abnormal      Abnormal conduction: incomplete RBBB    ST segments:     ST segments:  Normal  T waves:     T waves: normal               ED Course  ED Course as of 03/21/23 2220 Tue Mar 21, 2023   1841 Work-up unremarkable, will send with comprehensive spine follow-up for back pain, neurology and psychiatry follow-up and have discussed return precautions and outpatient follow-up which patient states she understands  Medical Decision Making  Patient is a 51-year-old female past medical history migraines, rheumatoid arthritis, anxiety presenting with multiple complaints  Patient is well-appearing at bedside with mild hypertension but mentating appropriately, with no gross amount is on neurologic exam, ambulatory at bedside with steady gait, no abdominal tenderness, lungs clear to auscultation, no lower extremity edema, equal pulses and no other significant physical exam findings  As patient has multiple complaints, vertigo, possible seizure but also with intermittent chest pain or shortness of breath will obtain CT CTA of the head and neck to assess for dissection, occlusion, intracranial mass, CT dissection study to assess for dissection, cardiac work-up to assess for ACS, electrolyte abnormalities, anemia, give meclizine and vertigo and reassess    Have not made stroke alert as patient states that the symptoms have been going on for several weeks  Amount and/or Complexity of Data Reviewed  Labs: ordered  Radiology: ordered  Disposition  Final diagnoses:   None     ED Disposition     None      Follow-up Information    None         Patient's Medications   Discharge Prescriptions    No medications on file       No discharge procedures on file      PDMP Review     None          ED Provider  Electronically Signed by           Terrell Handy DO  03/21/23 0981

## 2023-03-22 ENCOUNTER — TELEPHONE (OUTPATIENT)
Dept: PHYSICAL THERAPY | Facility: OTHER | Age: 53
End: 2023-03-22

## 2023-03-22 NOTE — TELEPHONE ENCOUNTER
Call placed to the patient per [facility] referral     Voice message left for patient to call back  Phone number and hours of business provided  This is the 1st attempt to reach the patient  Will defer per protocol  Pt was referred in to [facility] 12/2022  This ref DX is Vertigo? Nothing in ED note states neck, back or spine pain?

## 2023-03-28 NOTE — TELEPHONE ENCOUNTER
Call placed to the patient per [facility] referral      Voice message left for patient to call back  Phone number and hours of business provided       This is the 2nd attempt to reach the patient  Will defer per protocol      This ref DX is Vertigo? Nothing in ED note states neck, back or spine pain?

## 2025-05-28 ENCOUNTER — HOSPITAL ENCOUNTER (EMERGENCY)
Facility: HOSPITAL | Age: 55
Discharge: HOME | End: 2025-05-28
Attending: EMERGENCY MEDICINE | Admitting: EMERGENCY MEDICINE
Payer: COMMERCIAL

## 2025-05-28 VITALS
DIASTOLIC BLOOD PRESSURE: 90 MMHG | HEIGHT: 62 IN | BODY MASS INDEX: 28.89 KG/M2 | RESPIRATION RATE: 20 BRPM | SYSTOLIC BLOOD PRESSURE: 156 MMHG | OXYGEN SATURATION: 99 % | HEART RATE: 72 BPM | WEIGHT: 157 LBS | TEMPERATURE: 98.2 F

## 2025-05-28 DIAGNOSIS — M54.50 ACUTE EXACERBATION OF CHRONIC LOW BACK PAIN: ICD-10-CM

## 2025-05-28 DIAGNOSIS — M54.50 ACUTE LOW BACK PAIN: Primary | ICD-10-CM

## 2025-05-28 DIAGNOSIS — M54.9 MUSCULOSKELETAL BACK PAIN: ICD-10-CM

## 2025-05-28 DIAGNOSIS — G89.29 ACUTE EXACERBATION OF CHRONIC LOW BACK PAIN: ICD-10-CM

## 2025-05-28 PROCEDURE — 99285 EMERGENCY DEPT VISIT HI MDM: CPT

## 2025-05-28 PROCEDURE — 96372 THER/PROPH/DIAG INJ SC/IM: CPT

## 2025-05-28 PROCEDURE — 99282 EMERGENCY DEPT VISIT SF MDM: CPT

## 2025-05-28 RX ORDER — DEXAMETHASONE SODIUM PHOSPHATE 10 MG/ML
10 INJECTION, SOLUTION INTRAMUSCULAR; INTRAVENOUS ONCE
Status: DISCONTINUED | OUTPATIENT
Start: 2025-05-28 | End: 2025-05-28

## 2025-05-28 RX ORDER — DIAZEPAM 10 MG/2ML
2.5 INJECTION, SOLUTION INTRAMUSCULAR; INTRAVENOUS ONCE
Status: COMPLETED | OUTPATIENT
Start: 2025-05-28 | End: 2025-05-28

## 2025-05-28 RX ORDER — DIAZEPAM 10 MG/2ML
2.5 INJECTION, SOLUTION INTRAMUSCULAR; INTRAVENOUS ONCE
Status: DISCONTINUED | OUTPATIENT
Start: 2025-05-28 | End: 2025-05-28

## 2025-05-28 RX ORDER — IBUPROFEN 800 MG/1
800 TABLET, FILM COATED ORAL 3 TIMES DAILY
Qty: 21 TABLET | Refills: 0 | Status: SHIPPED | OUTPATIENT
Start: 2025-05-28

## 2025-05-28 RX ORDER — PREDNISONE 20 MG/1
60 TABLET ORAL DAILY
Qty: 15 TABLET | Refills: 0 | Status: SHIPPED | OUTPATIENT
Start: 2025-05-28 | End: 2025-06-02

## 2025-05-28 RX ORDER — KETOROLAC TROMETHAMINE 30 MG/ML
30 INJECTION, SOLUTION INTRAMUSCULAR; INTRAVENOUS ONCE
Status: COMPLETED | OUTPATIENT
Start: 2025-05-28 | End: 2025-05-28

## 2025-05-28 RX ORDER — DEXAMETHASONE SODIUM PHOSPHATE 10 MG/ML
10 INJECTION, SOLUTION INTRAMUSCULAR; INTRAVENOUS ONCE
Status: COMPLETED | OUTPATIENT
Start: 2025-05-28 | End: 2025-05-28

## 2025-05-28 RX ORDER — METHOCARBAMOL 500 MG/1
500 TABLET, FILM COATED ORAL 2 TIMES DAILY
Qty: 20 TABLET | Refills: 0 | Status: SHIPPED | OUTPATIENT
Start: 2025-05-28

## 2025-05-28 RX ORDER — KETOROLAC TROMETHAMINE 30 MG/ML
30 INJECTION, SOLUTION INTRAMUSCULAR; INTRAVENOUS ONCE
Status: DISCONTINUED | OUTPATIENT
Start: 2025-05-28 | End: 2025-05-28

## 2025-05-28 RX ADMIN — DEXAMETHASONE SODIUM PHOSPHATE 10 MG: 10 INJECTION, SOLUTION INTRAMUSCULAR; INTRAVENOUS at 12:27

## 2025-05-28 RX ADMIN — DIAZEPAM 2.5 MG: 5 INJECTION, SOLUTION INTRAMUSCULAR; INTRAVENOUS at 12:27

## 2025-05-28 RX ADMIN — KETOROLAC TROMETHAMINE 30 MG: 30 INJECTION, SOLUTION INTRAMUSCULAR at 12:27

## 2025-05-28 NOTE — Clinical Note
Tanya Whalen was seen and treated in our emergency department on 5/28/2025.                Diagnosis:     Tanya  may return to work on return date.    She may return on this date: 06/02/2025         If you have any questions or concerns, please don't hesitate to call.      Alejandra Galindo, DO    ______________________________           _______________          _______________  Hospital Representative                              Date                                Time

## 2025-05-28 NOTE — Clinical Note
Tanya Whalen was seen and treated in our emergency department on 5/28/2025.                Diagnosis:     Tanya  may return to work on return date.    She may return on this date: 05/30/2025         If you have any questions or concerns, please don't hesitate to call.      Hyun Vieyra PA-C    ______________________________           _______________          _______________  Hospital Representative                              Date                                Time

## 2025-05-28 NOTE — ED PROVIDER NOTES
Time reflects when diagnosis was documented in both MDM as applicable and the Disposition within this note       Time User Action Codes Description Comment    5/28/2025  1:21 PM Hyun Vieyra Add [M54.50] Acute low back pain     5/28/2025  1:22 PM Hyun Vieyra Add [M54.9] Musculoskeletal back pain     5/28/2025  1:22 PM Hyun Vieyra Add [M54.50,  G89.29] Acute exacerbation of chronic low back pain           ED Disposition       ED Disposition   Discharge    Condition   Stable    Date/Time   Wed May 28, 2025  1:21 PM    Comment   Tanya Whalen discharge to home/self care.                   Assessment & Plan       Medical Decision Making  55 yr old female with pmh panic attacks, RA, breast cancer with reconstruction presents with low back pain. Patient reports whenever she gets anxiety and stress, her low back pain flares. Patient reports Friday she went through a stressful day and immediately after her back started bothering her. Differential diagnoses includes lumbago versus musculoskeletal spasm / strain versus sciatica. No back pain red flags on history or physical. Presentation not consistent with malignancy (lack of history of malignancy, lack of B symptoms), fracture (no trauma, no bony tenderness to palpation), cauda equina (no bowel or urinary incontinence/retention, no saddle anesthesia, no distal weakness), AAA, viscus perforation , pulmonary embolism, renal colic, pyelonephritis (afebrile, no CVAT, no urinary symptoms). Given the clinical picture, no indication for imaging at this time. Will treat with pain control and reassess.    On reassessment, patient is feeling better. Patient is ambulating without difficulty. Rx for robaxin, prednisone, 800 mg motrin given. Comp Spine program referral given. Prior to discharge, discharge instructions were discussed with patient at bedside. Patient was provided both verbal and written instructions. Patient is understanding of the discharge  instructions and is agreeable to plan of care. Return precautions were discussed with patient bedside, patient verbalized understanding of signs and symptoms that would necessitate return to the ED. All questions were answered. Patient was comfortable with the plan of care and discharged to home.      Risk  Prescription drug management.             Medications   diazepam (VALIUM) injection 2.5 mg (2.5 mg Intramuscular Given 5/28/25 1227)   ketorolac (TORADOL) injection 30 mg (30 mg Intramuscular Given 5/28/25 1227)   dexamethasone (PF) (DECADRON) injection 10 mg (10 mg Intramuscular Given 5/28/25 1227)       ED Risk Strat Scores                    No data recorded        SBIRT 20yo+      Flowsheet Row Most Recent Value   Initial Alcohol Screen: US AUDIT-C     1. How often do you have a drink containing alcohol? 0 Filed at: 05/28/2025 1130   2. How many drinks containing alcohol do you have on a typical day you are drinking?  0 Filed at: 05/28/2025 1130   3b. FEMALE Any Age, or MALE 65+: How often do you have 4 or more drinks on one occassion? 0 Filed at: 05/28/2025 1130   Audit-C Score 0 Filed at: 05/28/2025 1130   ALLYSON: How many times in the past year have you...    Used an illegal drug or used a prescription medication for non-medical reasons? Never Filed at: 05/28/2025 1130                            History of Present Illness       Chief Complaint   Patient presents with    Back Pain     Ems from home c/o low back pain since Friday, denies difficulty urinating/ having Bms. States pain radiates down RLE        Past Medical History[1]   Past Surgical History[2]   Family History[3]   Social History[4]   E-Cigarette/Vaping      E-Cigarette/Vaping Substances      I have reviewed and agree with the history as documented.     55 yr old female with pmh panic attacks, RA, breast cancer with reconstruction presents with low back pain. Patient reports whenever she gets anxiety and stress, her low back pain flares. Patient  reports Friday she went through a stressful day and immediately after her back started bothering her. Reports pain radiates down her right leg. Denies any urinary or bowel incontinence, fevers, chills, SOB, chest pain, difficulty ambulating, or any other complaints.       Back Pain  Associated symptoms: no abdominal pain, no chest pain, no dysuria and no fever        Review of Systems   Constitutional:  Negative for chills and fever.   HENT:  Negative for ear pain and sore throat.    Eyes:  Negative for pain and visual disturbance.   Respiratory:  Negative for cough and shortness of breath.    Cardiovascular:  Negative for chest pain and palpitations.   Gastrointestinal:  Negative for abdominal pain and vomiting.   Genitourinary:  Negative for dysuria and hematuria.   Musculoskeletal:  Positive for back pain. Negative for arthralgias.   Skin:  Negative for color change and rash.   Neurological:  Negative for seizures and syncope.   All other systems reviewed and are negative.          Objective       ED Triage Vitals   Temperature Pulse Blood Pressure Respirations SpO2 Patient Position - Orthostatic VS   05/28/25 0938 05/28/25 0938 05/28/25 0938 05/28/25 0938 05/28/25 0938 05/28/25 0938   98.2 °F (36.8 °C) 72 156/90 20 99 % Sitting      Temp Source Heart Rate Source BP Location FiO2 (%) Pain Score    05/28/25 0938 05/28/25 0938 05/28/25 0938 -- 05/28/25 1227    Temporal Monitor Left arm  8      Vitals      Date and Time Temp Pulse SpO2 Resp BP Pain Score FACES Pain Rating User   05/28/25 1227 -- -- -- -- -- 8 -- SG   05/28/25 0938 98.2 °F (36.8 °C) 72 99 % 20 156/90 -- -- GP            Physical Exam  Vitals and nursing note reviewed.   Constitutional:       General: She is not in acute distress.     Appearance: She is well-developed.   HENT:      Head: Normocephalic and atraumatic.     Eyes:      Conjunctiva/sclera: Conjunctivae normal.       Cardiovascular:      Rate and Rhythm: Normal rate and regular rhythm.       Heart sounds: No murmur heard.  Pulmonary:      Effort: Pulmonary effort is normal. No respiratory distress.      Breath sounds: Normal breath sounds.   Abdominal:      Palpations: Abdomen is soft.      Tenderness: There is no abdominal tenderness.     Musculoskeletal:         General: No swelling.      Cervical back: Normal and neck supple.      Thoracic back: Normal. No bony tenderness. Normal range of motion.      Lumbar back: Spasms and tenderness present. No swelling or bony tenderness. Normal range of motion. Positive right straight leg raise test and positive left straight leg raise test.     Skin:     General: Skin is warm and dry.      Capillary Refill: Capillary refill takes less than 2 seconds.     Neurological:      Mental Status: She is alert.     Psychiatric:         Mood and Affect: Mood normal.         Results Reviewed       None            No orders to display       Procedures    ED Medication and Procedure Management   Prior to Admission Medications   Prescriptions Last Dose Informant Patient Reported? Taking?   celecoxib (CeleBREX) 100 mg capsule   Yes No   Sig: Take 100 mg by mouth as needed for mild pain   hydrocodone-chlorpheniramine polistirex (TUSSIONEX) 10-8 mg/5 mL ER suspension   No No   Sig: Take 5 mL by mouth every 12 (twelve) hours as needed for cough for up to 12 dosesMax Daily Amount: 10 mL   methocarbamol (ROBAXIN) 500 mg tablet   No No   Sig: Take 1 tablet (500 mg total) by mouth 2 (two) times a day   sertraline (ZOLOFT) 25 mg tablet   Yes No   Sig: Take 25 mg by mouth daily   topiramate (Topamax) 100 mg tablet   Yes No   Sig: Take 100 mg by mouth daily      Facility-Administered Medications: None     Discharge Medication List as of 5/28/2025  1:23 PM        START taking these medications    Details   ibuprofen (MOTRIN) 800 mg tablet Take 1 tablet (800 mg total) by mouth 3 (three) times a day, Starting Wed 5/28/2025, Normal      !! methocarbamol (ROBAXIN) 500 mg tablet Take 1 tablet  (500 mg total) by mouth 2 (two) times a day, Starting Wed 5/28/2025, Normal      predniSONE 20 mg tablet Take 3 tablets (60 mg total) by mouth daily for 5 days, Starting Wed 5/28/2025, Until Mon 6/2/2025, Normal       !! - Potential duplicate medications found. Please discuss with provider.        CONTINUE these medications which have NOT CHANGED    Details   celecoxib (CeleBREX) 100 mg capsule Take 100 mg by mouth as needed for mild pain, Historical Med      hydrocodone-chlorpheniramine polistirex (TUSSIONEX) 10-8 mg/5 mL ER suspension Take 5 mL by mouth every 12 (twelve) hours as needed for cough for up to 12 dosesMax Daily Amount: 10 mL, Starting Sun 5/16/2021, Print      !! methocarbamol (ROBAXIN) 500 mg tablet Take 1 tablet (500 mg total) by mouth 2 (two) times a day, Starting Mon 12/26/2022, Normal      sertraline (ZOLOFT) 25 mg tablet Take 25 mg by mouth daily, Historical Med      topiramate (Topamax) 100 mg tablet Take 100 mg by mouth daily, Historical Med       !! - Potential duplicate medications found. Please discuss with provider.          ED SEPSIS DOCUMENTATION   Time reflects when diagnosis was documented in both MDM as applicable and the Disposition within this note       Time User Action Codes Description Comment    5/28/2025  1:21 PM Hyun Vieyra [M54.50] Acute low back pain     5/28/2025  1:22 PM Hyun Vieyra Add [M54.9] Musculoskeletal back pain     5/28/2025  1:22 PM Hyun Vieyra [M54.50,  G89.29] Acute exacerbation of chronic low back pain                    [1]   Past Medical History:  Diagnosis Date    Allergic     BRCA1 gene mutation positive     Migraines     Panic attacks     one per year since she was a child    Rheumatoid arthritis ([facility])    [2]   Past Surgical History:  Procedure Laterality Date    BILATERAL OOPHORECTOMY  2018    BREAST SURGERY      Breast reduction at age 25    BREAST SURGERY      Tested positive for breast cancer gene at age 45    MASTECTOMY  Bilateral     Double mastectomy with expanders   [3]   Family History  Problem Relation Name Age of Onset    Breast cancer Mother      Arthritis Father          RA   [4]   Social History  Tobacco Use    Smoking status: Never    Smokeless tobacco: Never   Substance Use Topics    Alcohol use: Not Currently    Drug use: Not Currently        Hyun Vieyra PA-C  05/28/25 4917

## 2025-05-28 NOTE — DISCHARGE INSTRUCTIONS
You were evaluated in the Emergency Department today for your back pain. Your evaluation did not show signs of medical conditions requiring emergent intervention at this time.    We recommend you take 600mg ibuprofen every 6 hours or tylenol 650mg every 6 hours as needed for pain. If needed, you can alternate these medications so that you take one medication every 3 hours. For instance, at noon take ibuprofen, then at 3pm take tylenol, then at 6pm take ibuprofen.    Please take your prescribed muscle relaxer and steroids as directed.     Please schedule an appointment for follow-up with your primary care physician this week for further evaluation of your symptoms.    Return to the Emergency Department if you experience worsening back pain, difficulty walking, fevers, numbness, tingling, incontinence, or any other concerning symptoms.

## 2025-05-29 ENCOUNTER — TELEPHONE (OUTPATIENT)
Dept: PHYSICAL THERAPY | Facility: OTHER | Age: 55
End: 2025-05-29

## 2025-05-29 NOTE — TELEPHONE ENCOUNTER
Call placed to the patient per [facility] referral.    Voice message left for patient to call back. Phone number and hours of business provided.     This is the 1st attempt to reach the patient.  Will defer per protocol.    Pt had PT in 2021 for thoracic back pain